# Patient Record
Sex: FEMALE | Race: WHITE
[De-identification: names, ages, dates, MRNs, and addresses within clinical notes are randomized per-mention and may not be internally consistent; named-entity substitution may affect disease eponyms.]

---

## 2017-04-17 NOTE — ED
General Adult HPI





- General


Chief complaint: Shortness of Breath


Stated complaint: Cough/SOB


Time Seen by Provider: 04/17/17 13:42


Source: patient


Mode of arrival: ambulatory


Limitations: no limitations





- History of Present Illness


Initial comments: 





51-year-old female with history of asthma presenting for shortness of breath 

and cough.  Patient states she's had symptoms for the past week.  She states she

's having worsening productive cough.  She has tried a home nebulizer treatment 

once without much improvement.  She denies any recent antibiotic or steroid 

therapy.  She denies any chest pain associated.  She denies any fevers or 

chills.





- Related Data


 Home Medications











 Medication  Instructions  Recorded  Confirmed


 


ARIPiprazole [Abilify] 30 mg PO HS 11/06/16 04/17/17


 


DULoxetine HCL [Cymbalta] 20 mg PO HS 11/06/16 04/17/17


 


Dextroamphetamine/Amphetamine 30 mg PO DAILY 11/06/16 04/17/17





[Adderall]   


 


clonazePAM [KlonoPIN] 1 mg PO HS 11/06/16 04/17/17


 


lamoTRIgine [LaMICtal] 25 mg PO HS 11/06/16 04/17/17








 Previous Rx's











 Medication  Instructions  Recorded


 


Albuterol Inhaler [Ventolin Hfa 2 puff INHALATION Q6HR PRN #1 04/17/17





Inhaler] inhaler 


 


Azithromycin [Zithromax] 250 mg PO DAILY #6 tab 04/17/17


 


predniSONE 40 mg PO DAILY #10 tab 04/17/17











 Allergies











Allergy/AdvReac Type Severity Reaction Status Date / Time


 


No Known Allergies Allergy   Verified 04/17/17 14:12














Review of Systems


ROS Statement: 


Those systems with pertinent positive or pertinent negative responses have been 

documented in the HPI.





ROS Other: All systems not noted in ROS Statement are negative.





Past Medical History


Past Medical History: Asthma


History of Any Multi-Drug Resistant Organisms: None Reported


Past Surgical History: No Surgical Hx Reported


Past Psychological History: Bipolar


Smoking Status: Current every day smoker


Past Alcohol Use History: None Reported


Past Drug Use History: None Reported





General Exam





- General Exam Comments


Initial Comments: 





General:  Awake and Alert. No acute distress. Does not appear acutely ill.  

Obese.


Eyes: JOSE DE JESUS, EOM intact. No nystagmus. No scleral icterus.  


HENT: Atraumatic, normocephalic. Mucous membranes moist. Trachea midline.


Neck: The neck is supple, there is no tenderness or JVD.  


Cardiovascular:  Regular rate and rhythm. No murmur, rub, or gallop is 

appreciated. Distal pulses intact.


Respiratory:  Lungs are clear to auscultation bilaterally.  Diffuse wheezes. No 

rales, rhonchi. No respiratory distress.


Gastrointestinal:  Soft, Nontender. No rebound or guarding. Non-distended. No 

masses or organomegaly noted. No CVA tenderness.


Musculoskeletal:  No tenderness. Normal ROM. No gross deformity. No strength 

deficits. 


Neurological: A&Ox3. CN II-XII grossly intact, There are no obvious motor or 

sensory deficits. Coordination appears grossly intact. Speech is normal.


Skin: Skin is warm and dry and no rashes or lesions are noted. 


Psychiatric: Cooperative, appropriate mood & affect, normal judgment.  


Limitations: no limitations





Course


 Vital Signs











  04/17/17 04/17/17 04/17/17





  13:26 13:41 14:04


 


Temperature 97.6 F  


 


Pulse Rate 90  91


 


Respiratory 18 20 





Rate   


 


Blood Pressure 120/76  


 


O2 Sat by Pulse 96  





Oximetry   














  04/17/17 04/17/17 04/17/17





  14:09 14:22 15:07


 


Temperature   98.2 F


 


Pulse Rate 97 80 79


 


Respiratory  18 18





Rate   


 


Blood Pressure   142/69


 


O2 Sat by Pulse  98 98





Oximetry   














Medical Decision Making





- Medical Decision Making





51-year-old female history of tobacco abuse and COPD presenting for cough and 

shortness of breath.  Patient without any significant respiratory distress 

initial exam.  She does have diffuse wheezes.  She is given a breathing 

treatment as well as initial dose of steroids.  Chest x-ray is performed 

without acute process.  Given her productive cough and risk with smoking and 

COPD, plan to cover azithromycin as well as steroid therapy.  Patient was 

written for inhaler.  She states she has an old nebulizer that does not always 

work.  Discussed close follow-up with PCP and to discuss getting a new one.  On 

reevaluation patient's wheezing appears improved.  She states she is feeling 

much better.  Discussed concerning signs symptoms for immediate return to ED.  

Patient is otherwise stable for discharge home.  Patient is agreeable with plan 

and discharge home.





- Radiology Data


Radiology results: report reviewed, image reviewed





Disposition


Clinical Impression: 


 COPD exacerbation, Cough, Tobacco abuse





Disposition: HOME SELF-CARE


Condition: Stable


Instructions:  COPD (Chronic Obstructive Pulmonary Disease) (ED), Acute Cough (

ED)


Prescriptions: 


Albuterol Inhaler [Ventolin Hfa Inhaler] 2 puff INHALATION Q6HR PRN #1 inhaler


 PRN Reason: Shortness Of Breath


Azithromycin [Zithromax] 250 mg PO DAILY #6 tab


predniSONE 40 mg PO DAILY #10 tab


Referrals: 


ERIK Tineo III, MD [Primary Care Provider] - 1-2 days


Time of Disposition: 14:51

## 2017-04-17 NOTE — XR
EXAMINATION TYPE: XR chest 2V

 

DATE OF EXAM: 4/17/2017 2:16 PM

 

COMPARISON: 11/6/2016

 

INDICATION: Cough congestion history of asthma

 

TECHNIQUE: Single frontal view of the chest is obtained.

 

FINDINGS:  

The heart size is normal.  

The pulmonary vasculature is normal.  

The lungs are clear.  

 

 

IMPRESSION:  

1. No acute pulmonary process.

## 2018-02-26 NOTE — ED
General Adult HPI





- General


Chief complaint: Recheck/Abnormal Lab/Rx


Stated complaint: tired/cannot urinate


Time Seen by Provider: 02/26/18 15:50


Source: patient, RN notes reviewed


Mode of arrival: ambulatory


Limitations: no limitations





- History of Present Illness


Initial comments: 





This a 52-year-old female presents emergency Department with complaints of 

fatigue, decrease appetite.  She states that over the last 2 days she just hasn'

t felt well.  States she does not want to eat or drink.  She denies any chest 

pain, shortness breath, headache or dizziness.  She states that she is very 

thirsty at this time but doesn't have the energy to eat or drink.  She denies 

any known fever denies dysuria or hematuria denies any constipation or diarrhea.





- Related Data


 Home Medications











 Medication  Instructions  Recorded  Confirmed


 


DULoxetine HCL [Cymbalta] 20 mg PO DAILY 11/06/16 02/26/18


 


Albuterol Inhaler [Ventolin Hfa 2 puff INHALATION RT-Q6H PRN 02/26/18 02/26/18





Inhaler]   








 Previous Rx's











 Medication  Instructions  Recorded


 


Ciprofloxacin HCl [Cipro] 500 mg PO Q12HR #10 tablet 02/26/18











 Allergies











Allergy/AdvReac Type Severity Reaction Status Date / Time


 


No Known Allergies Allergy   Verified 02/26/18 16:52














Review of Systems


ROS Statement: 


Those systems with pertinent positive or pertinent negative responses have been 

documented in the HPI.





ROS Other: All systems not noted in ROS Statement are negative.





Past Medical History


Past Medical History: Asthma


History of Any Multi-Drug Resistant Organisms: None Reported


Past Surgical History: No Surgical Hx Reported


Past Psychological History: Bipolar


Smoking Status: Current every day smoker


Past Alcohol Use History: None Reported


Past Drug Use History: None Reported





General Exam


Limitations: no limitations


General appearance: alert, in no apparent distress


Head exam: Present: atraumatic, normocephalic, normal inspection


Eye exam: Present: normal appearance, PERRL, EOMI.  Absent: scleral icterus, 

conjunctival injection, periorbital swelling


ENT exam: Present: normal exam, normal oropharynx, mucous membranes moist, TM's 

normal bilaterally, normal external ear exam


Neck exam: Present: normal inspection, full ROM.  Absent: tenderness, 

meningismus, lymphadenopathy


Respiratory exam: Present: normal lung sounds bilaterally.  Absent: respiratory 

distress, wheezes, rales, rhonchi, stridor


Cardiovascular Exam: Present: regular rate, normal rhythm, normal heart sounds.

  Absent: systolic murmur, diastolic murmur, rubs, gallop, clicks


GI/Abdominal exam: Present: soft, normal bowel sounds.  Absent: distended, 

tenderness, guarding, rebound, rigid


Back exam: Absent: CVA tenderness (R), CVA tenderness (L)


Skin exam: Present: warm, dry, intact, normal color.  Absent: rash





Course


 Vital Signs











  02/26/18





  15:32


 


Temperature 97.5 F L


 


Pulse Rate 66


 


Respiratory 17





Rate 


 


Blood Pressure 129/81


 


O2 Sat by Pulse 93 L





Oximetry 














EKG Findings





- EKG Comments:


EKG Findings:: EKG performed at 16:16 normal sinus rhythm with a rate of 66.   

pr 168 QRS 82 QT//434





Medical Decision Making





- Medical Decision Making





52-year-old female presented emergency department for weakness, not feeling 

well.  Patient does have urinary tract infection.  Patient's labwork otherwise 

unremarkable.  Patient was well-hydrated emergency department.  She'll be 

discharged on antibiotics.





- Lab Data


Result diagrams: 


 02/26/18 16:03





 02/26/18 16:03


 Lab Results











  02/26/18 02/26/18 02/26/18 Range/Units





  16:03 16:03 16:03 


 


WBC   7.0   (3.8-10.6)  k/uL


 


RBC   5.22   (3.80-5.40)  m/uL


 


Hgb   15.0   (11.4-16.0)  gm/dL


 


Hct   47.1 H   (34.0-46.0)  %


 


MCV   90.2   (80.0-100.0)  fL


 


MCH   28.6   (25.0-35.0)  pg


 


MCHC   31.8   (31.0-37.0)  g/dL


 


RDW   14.5   (11.5-15.5)  %


 


Plt Count   356   (150-450)  k/uL


 


Neutrophils %   69   %


 


Lymphocytes %   21   %


 


Monocytes %   7   %


 


Eosinophils %   1   %


 


Basophils %   1   %


 


Neutrophils #   4.9   (1.3-7.7)  k/uL


 


Lymphocytes #   1.5   (1.0-4.8)  k/uL


 


Monocytes #   0.5   (0-1.0)  k/uL


 


Eosinophils #   0.1   (0-0.7)  k/uL


 


Basophils #   0.1   (0-0.2)  k/uL


 


Sodium    142  (137-145)  mmol/L


 


Potassium    4.3  (3.5-5.1)  mmol/L


 


Chloride    107  ()  mmol/L


 


Carbon Dioxide    23  (22-30)  mmol/L


 


Anion Gap    12  mmol/L


 


BUN    11  (7-17)  mg/dL


 


Creatinine    0.70  (0.52-1.04)  mg/dL


 


Est GFR (MDRD) Af Amer    >60  (>60 ml/min/1.73 sqM)  


 


Est GFR (MDRD) Non-Af    >60  (>60 ml/min/1.73 sqM)  


 


Glucose    89  (74-99)  mg/dL


 


Calcium    9.6  (8.4-10.2)  mg/dL


 


Magnesium    1.9  (1.6-2.3)  mg/dL


 


Total Bilirubin    0.5  (0.2-1.3)  mg/dL


 


AST    17  (14-36)  U/L


 


ALT    27  (9-52)  U/L


 


Alkaline Phosphatase    96  ()  U/L


 


Total Creatine Kinase  57    ()  U/L


 


CK-MB (CK-2)  0.2    (0.0-2.4)  ng/mL


 


CK-MB (CK-2) Rel Index  0.4    


 


Troponin I  <0.012    (0.000-0.034)  ng/mL


 


Total Protein    7.0  (6.3-8.2)  g/dL


 


Albumin    4.1  (3.5-5.0)  g/dL


 


TSH    1.010  (0.465-4.680)  mIU/L


 


Urine Color     


 


Urine Appearance     (Clear)  


 


Urine pH     (5.0-8.0)  


 


Ur Specific Gravity     (1.001-1.035)  


 


Urine Protein     (Negative)  


 


Urine Glucose (UA)     (Negative)  


 


Urine Blood     (Negative)  


 


Urine Nitrite     (Negative)  


 


Urine Bilirubin     (Negative)  


 


Urine Urobilinogen     (<2.0)  mg/dL


 


Ur Leukocyte Esterase     (Negative)  


 


Urine RBC     (0-5)  /hpf


 


Urine WBC     (0-5)  /hpf


 


Ur Squamous Epith Cells     (0-4)  /hpf


 


Urine Mucus     (None)  /hpf














  02/26/18 Range/Units





  16:43 


 


WBC   (3.8-10.6)  k/uL


 


RBC   (3.80-5.40)  m/uL


 


Hgb   (11.4-16.0)  gm/dL


 


Hct   (34.0-46.0)  %


 


MCV   (80.0-100.0)  fL


 


MCH   (25.0-35.0)  pg


 


MCHC   (31.0-37.0)  g/dL


 


RDW   (11.5-15.5)  %


 


Plt Count   (150-450)  k/uL


 


Neutrophils %   %


 


Lymphocytes %   %


 


Monocytes %   %


 


Eosinophils %   %


 


Basophils %   %


 


Neutrophils #   (1.3-7.7)  k/uL


 


Lymphocytes #   (1.0-4.8)  k/uL


 


Monocytes #   (0-1.0)  k/uL


 


Eosinophils #   (0-0.7)  k/uL


 


Basophils #   (0-0.2)  k/uL


 


Sodium   (137-145)  mmol/L


 


Potassium   (3.5-5.1)  mmol/L


 


Chloride   ()  mmol/L


 


Carbon Dioxide   (22-30)  mmol/L


 


Anion Gap   mmol/L


 


BUN   (7-17)  mg/dL


 


Creatinine   (0.52-1.04)  mg/dL


 


Est GFR (MDRD) Af Amer   (>60 ml/min/1.73 sqM)  


 


Est GFR (MDRD) Non-Af   (>60 ml/min/1.73 sqM)  


 


Glucose   (74-99)  mg/dL


 


Calcium   (8.4-10.2)  mg/dL


 


Magnesium   (1.6-2.3)  mg/dL


 


Total Bilirubin   (0.2-1.3)  mg/dL


 


AST   (14-36)  U/L


 


ALT   (9-52)  U/L


 


Alkaline Phosphatase   ()  U/L


 


Total Creatine Kinase   ()  U/L


 


CK-MB (CK-2)   (0.0-2.4)  ng/mL


 


CK-MB (CK-2) Rel Index   


 


Troponin I   (0.000-0.034)  ng/mL


 


Total Protein   (6.3-8.2)  g/dL


 


Albumin   (3.5-5.0)  g/dL


 


TSH   (0.465-4.680)  mIU/L


 


Urine Color  Yellow  


 


Urine Appearance  Cloudy H  (Clear)  


 


Urine pH  5.5  (5.0-8.0)  


 


Ur Specific Gravity  1.023  (1.001-1.035)  


 


Urine Protein  1+ H  (Negative)  


 


Urine Glucose (UA)  Negative  (Negative)  


 


Urine Blood  Negative  (Negative)  


 


Urine Nitrite  Positive H  (Negative)  


 


Urine Bilirubin  Negative  (Negative)  


 


Urine Urobilinogen  4.0  (<2.0)  mg/dL


 


Ur Leukocyte Esterase  Small H  (Negative)  


 


Urine RBC  1  (0-5)  /hpf


 


Urine WBC  16 H  (0-5)  /hpf


 


Ur Squamous Epith Cells  3  (0-4)  /hpf


 


Urine Mucus  Many H  (None)  /hpf














Disposition


Clinical Impression: 


 UTI (urinary tract infection), Fatigue





Disposition: HOME SELF-CARE


Condition: Stable


Instructions:  Urinary Tract Infection in Women (ED)


Additional Instructions: 


Please return to the Emergency Department if symptoms worsen or any other 

concerns.


Prescriptions: 


Ciprofloxacin HCl [Cipro] 500 mg PO Q12HR #10 tablet


Referrals: 


Cristóbal Mancini DO [Primary Care Provider] - 1-2 days


Time of Disposition: 17:17

## 2018-04-29 ENCOUNTER — HOSPITAL ENCOUNTER (EMERGENCY)
Dept: HOSPITAL 47 - EC | Age: 53
Discharge: HOME | End: 2018-04-29
Payer: COMMERCIAL

## 2018-04-29 VITALS
RESPIRATION RATE: 18 BRPM | HEART RATE: 79 BPM | TEMPERATURE: 98.3 F | SYSTOLIC BLOOD PRESSURE: 105 MMHG | DIASTOLIC BLOOD PRESSURE: 70 MMHG

## 2018-04-29 DIAGNOSIS — Z79.899: ICD-10-CM

## 2018-04-29 DIAGNOSIS — Z23: ICD-10-CM

## 2018-04-29 DIAGNOSIS — F17.200: ICD-10-CM
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
## 2018-04-29 DIAGNOSIS — S61.411A: Primary | ICD-10-CM
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
## 2018-04-29 DIAGNOSIS — F31.9: ICD-10-CM

## 2018-04-29 PROCEDURE — 90471 IMMUNIZATION ADMIN: CPT

## 2018-04-29 PROCEDURE — 99283 EMERGENCY DEPT VISIT LOW MDM: CPT

## 2018-04-29 PROCEDURE — 90715 TDAP VACCINE 7 YRS/> IM: CPT

## 2018-04-29 NOTE — ED
General Adult HPI





- General


Chief complaint: Animal Bite


Stated complaint: Cat Bite


Time Seen by Provider: 04/29/18 16:30


Source: patient, RN notes reviewed


Mode of arrival: ambulatory


Limitations: no limitations





- History of Present Illness


Initial comments: 





52-year-old female presents to the emergency department for chief complaint of 

cat bite x 1 hour.  Patient states that an hour ago her cat was about 5 or 

other cat when she intervened and was bitten and scratched on the right hand.  

Patient states he has cats are up-to-date with the rabies vaccinations and all 

other vaccinations.  Patient states these cats are her own cats.  Patient 

denies any other complaints including shortness of breath, chest pain, 

abdominal pain, nausea or vomiting.





- Related Data


 Home Medications











 Medication  Instructions  Recorded  Confirmed


 


DULoxetine HCL [Cymbalta] 20 mg PO DAILY 11/06/16 02/26/18


 


Albuterol Inhaler [Ventolin Hfa 2 puff INHALATION RT-Q6H PRN 02/26/18 02/26/18





Inhaler]   








 Previous Rx's











 Medication  Instructions  Recorded


 


Ciprofloxacin HCl [Cipro] 500 mg PO Q12HR #10 tablet 02/26/18


 


Amoxicillin/Potassium Clav 1 tab PO Q12HR #20 tab 04/29/18





[Augmentin 875-125 Tablet]  











 Allergies











Allergy/AdvReac Type Severity Reaction Status Date / Time


 


No Known Allergies Allergy   Verified 04/29/18 16:07














Review of Systems


ROS Statement: 


Those systems with pertinent positive or pertinent negative responses have been 

documented in the HPI.





ROS Other: All systems not noted in ROS Statement are negative.





Past Medical History


Past Medical History: Asthma


History of Any Multi-Drug Resistant Organisms: None Reported


Past Surgical History: No Surgical Hx Reported


Past Psychological History: Bipolar


Smoking Status: Current every day smoker


Past Alcohol Use History: None Reported


Past Drug Use History: None Reported





General Exam


Limitations: no limitations


General appearance: alert, in no apparent distress


Head exam: Present: atraumatic


Respiratory exam: Present: normal lung sounds bilaterally.  Absent: respiratory 

distress, wheezes, rales, rhonchi, stridor


Cardiovascular Exam: Present: regular rate, normal rhythm, normal heart sounds.

  Absent: systolic murmur, diastolic murmur, rubs, gallop, clicks


Extremities exam: Present: normal inspection, full ROM (in RUE.  All fingers 

have full flexion and extension with resistance.  No tendon injury.  No deeper 

tissue injury.  ), normal capillary refill (< 2 seconds and radial pulse 2+ in 

RUE.), other (there is a 0.5 cm laceration on the proximal 2nd metatarsal and 2 

cm laceration on the proximal 4th metatarsal of the L hand.  There is some 

ecchymosis around the lacerations.  No redness or drainage from the wounds.  No 

signs of infection.  No streaking redness.  ).  Absent: tenderness (in RUE), 

pedal edema, joint swelling, calf tenderness





Course


 Vital Signs











  04/29/18





  16:04


 


Temperature 98.3 F


 


Pulse Rate 79


 


Respiratory 18





Rate 


 


Blood Pressure 105/70


 


O2 Sat by Pulse 94 L





Oximetry 














Medical Decision Making





- Medical Decision Making





52-year-old female presents to the emergency department for a chief complaint 

of Bites to the right hand.  There are 2 small lacerations on the dorsal right 

hand as well as scratches.  No signs of infection noted.  Neurovascular intact 

in full range of motion of the right hand.  Wound was soaked in sterile water, 

iodine, and soap for 20 minutes.  Wound was cleaned out with saline jet lavage.

  It was cleaned out with iodine.  Wounds were covered with bacitracin and Band-

Aids.  Wounds were left open to allow for drainage.  Patient is to follow-up 

with orthopedics in one to 2 days.  She can follow-up with her primary care 

provider if she cannot get into see orthopedics.  She is to return to the 

emergency Department if she notices any signs of infection, fever, or worsening 

symptoms.  Signs of infection were discussed.  Patient agrees with this route 

of action.





Disposition


Clinical Impression: 


 Cat bite





Disposition: HOME SELF-CARE


Condition: Good


Instructions:  Animal Bite (ED)


Additional Instructions: 


Please keep wounds clean.  You may cover wounds with a loose Band-Aid.  Finish 

antibiotic as directed.  You may ice and elevate the hand as discussed.  Follow 

up with orthopedics tomorrow.  If any signs of infection occur such as 

spreading redness, streaking redness, drainage, or fevers please return to the 

emergency department.


Prescriptions: 


Amoxicillin/Potassium Clav [Augmentin 875-125 Tablet] 1 tab PO Q12HR #20 tab


Is patient prescribed a controlled substance at d/c from ED?: No


Referrals: 


None,Stated [Primary Care Provider] - 1-2 days


RUTHANN Shah DO [Doctor of Osteopathic Medicine] - 1-2 days


Time of Disposition: 17:11

## 2018-10-06 ENCOUNTER — HOSPITAL ENCOUNTER (EMERGENCY)
Dept: HOSPITAL 47 - EC | Age: 53
Discharge: LEFT BEFORE DISCHARGE | End: 2018-10-06
Payer: COMMERCIAL

## 2018-10-06 VITALS — SYSTOLIC BLOOD PRESSURE: 115 MMHG | HEART RATE: 94 BPM | DIASTOLIC BLOOD PRESSURE: 59 MMHG

## 2018-10-06 VITALS — TEMPERATURE: 99.2 F | RESPIRATION RATE: 18 BRPM

## 2018-10-06 DIAGNOSIS — J44.1: Primary | ICD-10-CM

## 2018-10-06 DIAGNOSIS — Z79.899: ICD-10-CM

## 2018-10-06 DIAGNOSIS — F32.9: ICD-10-CM

## 2018-10-06 DIAGNOSIS — F17.210: ICD-10-CM

## 2018-10-06 LAB
ALBUMIN SERPL-MCNC: 3.9 G/DL (ref 3.5–5)
ALP SERPL-CCNC: 76 U/L (ref 38–126)
ALT SERPL-CCNC: 12 U/L (ref 9–52)
ANION GAP SERPL CALC-SCNC: 8 MMOL/L
AST SERPL-CCNC: 17 U/L (ref 14–36)
BASOPHILS # BLD AUTO: 0.1 K/UL (ref 0–0.2)
BASOPHILS NFR BLD AUTO: 1 %
BUN SERPL-SCNC: 11 MG/DL (ref 7–17)
CALCIUM SPEC-MCNC: 9.4 MG/DL (ref 8.4–10.2)
CHLORIDE SERPL-SCNC: 111 MMOL/L (ref 98–107)
CO2 SERPL-SCNC: 23 MMOL/L (ref 22–30)
EOSINOPHIL # BLD AUTO: 0.2 K/UL (ref 0–0.7)
EOSINOPHIL NFR BLD AUTO: 2 %
ERYTHROCYTE [DISTWIDTH] IN BLOOD BY AUTOMATED COUNT: 5.27 M/UL (ref 3.8–5.4)
ERYTHROCYTE [DISTWIDTH] IN BLOOD: 13.9 % (ref 11.5–15.5)
GLUCOSE SERPL-MCNC: 94 MG/DL (ref 74–99)
HCT VFR BLD AUTO: 46.7 % (ref 34–46)
HGB BLD-MCNC: 16.2 GM/DL (ref 11.4–16)
LYMPHOCYTES # SPEC AUTO: 2 K/UL (ref 1–4.8)
LYMPHOCYTES NFR SPEC AUTO: 24 %
MAGNESIUM SPEC-SCNC: 1.9 MG/DL (ref 1.6–2.3)
MCH RBC QN AUTO: 30.7 PG (ref 25–35)
MCHC RBC AUTO-ENTMCNC: 34.6 G/DL (ref 31–37)
MCV RBC AUTO: 88.7 FL (ref 80–100)
MONOCYTES # BLD AUTO: 0.5 K/UL (ref 0–1)
MONOCYTES NFR BLD AUTO: 6 %
NEUTROPHILS # BLD AUTO: 5.5 K/UL (ref 1.3–7.7)
NEUTROPHILS NFR BLD AUTO: 66 %
PLATELET # BLD AUTO: 354 K/UL (ref 150–450)
POTASSIUM SERPL-SCNC: 4.6 MMOL/L (ref 3.5–5.1)
PROT SERPL-MCNC: 6.8 G/DL (ref 6.3–8.2)
SODIUM SERPL-SCNC: 142 MMOL/L (ref 137–145)
WBC # BLD AUTO: 8.3 K/UL (ref 3.8–10.6)

## 2018-10-06 PROCEDURE — 83735 ASSAY OF MAGNESIUM: CPT

## 2018-10-06 PROCEDURE — 84484 ASSAY OF TROPONIN QUANT: CPT

## 2018-10-06 PROCEDURE — 83880 ASSAY OF NATRIURETIC PEPTIDE: CPT

## 2018-10-06 PROCEDURE — 96374 THER/PROPH/DIAG INJ IV PUSH: CPT

## 2018-10-06 PROCEDURE — 85379 FIBRIN DEGRADATION QUANT: CPT

## 2018-10-06 PROCEDURE — 80053 COMPREHEN METABOLIC PANEL: CPT

## 2018-10-06 PROCEDURE — 85025 COMPLETE CBC W/AUTO DIFF WBC: CPT

## 2018-10-06 PROCEDURE — 93005 ELECTROCARDIOGRAM TRACING: CPT

## 2018-10-06 PROCEDURE — 36415 COLL VENOUS BLD VENIPUNCTURE: CPT

## 2018-10-06 PROCEDURE — 94640 AIRWAY INHALATION TREATMENT: CPT

## 2018-10-06 PROCEDURE — 71046 X-RAY EXAM CHEST 2 VIEWS: CPT

## 2018-10-06 PROCEDURE — 99285 EMERGENCY DEPT VISIT HI MDM: CPT

## 2018-10-06 NOTE — XR
EXAMINATION TYPE: XR chest 2V

 

DATE OF EXAM: 10/6/2018

 

COMPARISON: 2/26/2018

 

HISTORY: Chest pain and difficulty breathing

 

TECHNIQUE:  Frontal and lateral views of the chest are obtained.

 

FINDINGS:  There is no focal air space opacity, pleural effusion, or pneumothorax seen.  The cardiac 
silhouette size is within normal limits.   The osseous structures are intact.

 

IMPRESSION:  No acute cardiopulmonary process.

## 2018-10-06 NOTE — ED
General Adult HPI





- General


Chief complaint: Shortness of Breath


Stated complaint: Sob


Time Seen by Provider: 10/06/18 15:01


Source: patient


Mode of arrival: EMS


Limitations: no limitations





- History of Present Illness


Initial comments: 


Patient is a 53-year-old female with a history of asthma, and emphysema who 

presents with a chief complaint of shortness of breath.  This been going on for 

about a week and half.  Patient states this is gradually been getting worse.  

She cannot identify an inciting incident.  She is a current pack per day smoker 

is trying to quit.  The patient states that today she became more short of 

breath and experienced chest pain, diaphoresis, had one episode of emesis, and 

felt lightheaded.  She states that the chest pain was sharp in nature and 

squeezing.  She states that she is currently pain-free.  She has not had a 

recent stress test.








- Related Data


 Home Medications











 Medication  Instructions  Recorded  Confirmed


 


DULoxetine HCL [Cymbalta] 20 mg PO DAILY 11/06/16 04/29/18


 


Albuterol Inhaler [Ventolin Hfa 2 puff INHALATION RT-Q6H PRN 02/26/18 04/29/18





Inhaler]   








 Previous Rx's











 Medication  Instructions  Recorded


 


Amoxicillin/Potassium Clav 1 tab PO Q12HR #20 tab 04/29/18





[Augmentin 875-125 Tablet]  











 Allergies











Allergy/AdvReac Type Severity Reaction Status Date / Time


 


No Known Allergies Allergy   Verified 04/29/18 16:07














Review of Systems


ROS Statement: 


Those systems with pertinent positive or pertinent negative responses have been 

documented in the HPI.





ROS Other: All systems not noted in ROS Statement are negative.


Respiratory: Reports: dyspnea, wheezes


Cardiovascular: Reports: chest pain





Past Medical History


Past Medical History: Asthma


History of Any Multi-Drug Resistant Organisms: None Reported


Past Surgical History: Tubal Ligation


Past Psychological History: Bipolar, Depression


Smoking Status: Current every day smoker


Past Alcohol Use History: None Reported


Past Drug Use History: None Reported





General Exam


Limitations: no limitations


General appearance: alert, in no apparent distress


Head exam: Present: atraumatic, normocephalic


Eye exam: Present: normal appearance, PERRL


ENT exam: Present: normal exam, mucous membranes moist


Neck exam: Present: normal inspection


Respiratory exam: Present: normal lung sounds bilaterally.  Absent: respiratory 

distress, wheezes


Cardiovascular Exam: Present: regular rate, normal rhythm


GI/Abdominal exam: Present: soft.  Absent: distended, tenderness


Rectal exam: Present: deferred


Extremities exam: Present: normal inspection


Back exam: Present: normal inspection


Neurological exam: Present: alert, oriented X3


Psychiatric exam: Present: normal affect, normal mood


Skin exam: Present: warm, dry, intact





Course


 Vital Signs











  10/06/18 10/06/18 10/06/18





  14:53 15:53 16:03


 


Temperature 99.2 F  


 


Pulse Rate 90 80 84


 


Respiratory 18  





Rate   


 


Blood Pressure 108/68  


 


O2 Sat by Pulse 94 L  





Oximetry   














  10/06/18 10/06/18





  16:18 17:31


 


Temperature  99.2 F


 


Pulse Rate 82 89


 


Respiratory  18





Rate  


 


Blood Pressure  110/59


 


O2 Sat by Pulse  98





Oximetry  














Medical Decision Making





- Medical Decision Making


Review presents with chief complaint of shortness of breath and chest pain.  On 

initial evaluation, vitals are stable, patient is in no acute distress.  She 

received 1 DuoNeb and round and states that her breathing is much improved.  

Patient will be evaluated with basic labs including troponin, and chest x-ray.  

Patient given 3 breathing treatments, Solu-Medrol, and aspirin on arrival.  

Pending EKG.





3:37 PM


EKG performed at 3:10 PM shows normal sinus rhythm with a rate of 84 bpm.  EKG 

is otherwise unremarkable.





5:59 PM


Lab evaluation this patient is unremarkable.  Initial troponin is negative.  On 

reevaluation, patient is improved after breathing treatments and steroids.  

Case discussed with Dr. Stromberg who agrees with admission for stress test.  

Results and I discussed with the patient, she is agreeable.








- Lab Data


Result diagrams: 


 10/06/18 15:00





 10/06/18 15:00


 Lab Results











  10/06/18 10/06/18 10/06/18 Range/Units





  15:00 15:00 15:00 


 


WBC  8.3    (3.8-10.6)  k/uL


 


RBC  5.27    (3.80-5.40)  m/uL


 


Hgb  16.2 H    (11.4-16.0)  gm/dL


 


Hct  46.7 H    (34.0-46.0)  %


 


MCV  88.7    (80.0-100.0)  fL


 


MCH  30.7    (25.0-35.0)  pg


 


MCHC  34.6    (31.0-37.0)  g/dL


 


RDW  13.9    (11.5-15.5)  %


 


Plt Count  354    (150-450)  k/uL


 


Neutrophils %  66    %


 


Lymphocytes %  24    %


 


Monocytes %  6    %


 


Eosinophils %  2    %


 


Basophils %  1    %


 


Neutrophils #  5.5    (1.3-7.7)  k/uL


 


Lymphocytes #  2.0    (1.0-4.8)  k/uL


 


Monocytes #  0.5    (0-1.0)  k/uL


 


Eosinophils #  0.2    (0-0.7)  k/uL


 


Basophils #  0.1    (0-0.2)  k/uL


 


D-Dimer     (<0.60)  mg/L FEU


 


Sodium   142   (137-145)  mmol/L


 


Potassium   4.6   (3.5-5.1)  mmol/L


 


Chloride   111 H   ()  mmol/L


 


Carbon Dioxide   23   (22-30)  mmol/L


 


Anion Gap   8   mmol/L


 


BUN   11   (7-17)  mg/dL


 


Creatinine   0.70   (0.52-1.04)  mg/dL


 


Est GFR (CKD-EPI)AfAm   >90   (>60 ml/min/1.73 sqM)  


 


Est GFR (CKD-EPI)NonAf   >90   (>60 ml/min/1.73 sqM)  


 


Glucose   94   (74-99)  mg/dL


 


Calcium   9.4   (8.4-10.2)  mg/dL


 


Magnesium   1.9   (1.6-2.3)  mg/dL


 


Total Bilirubin   0.5   (0.2-1.3)  mg/dL


 


AST   17   (14-36)  U/L


 


ALT   12   (9-52)  U/L


 


Alkaline Phosphatase   76   ()  U/L


 


Troponin I     (0.000-0.034)  ng/mL


 


NT-Pro-B Natriuret Pep    42  pg/mL


 


Total Protein   6.8   (6.3-8.2)  g/dL


 


Albumin   3.9   (3.5-5.0)  g/dL














  10/06/18 10/06/18 Range/Units





  15:00 15:00 


 


WBC    (3.8-10.6)  k/uL


 


RBC    (3.80-5.40)  m/uL


 


Hgb    (11.4-16.0)  gm/dL


 


Hct    (34.0-46.0)  %


 


MCV    (80.0-100.0)  fL


 


MCH    (25.0-35.0)  pg


 


MCHC    (31.0-37.0)  g/dL


 


RDW    (11.5-15.5)  %


 


Plt Count    (150-450)  k/uL


 


Neutrophils %    %


 


Lymphocytes %    %


 


Monocytes %    %


 


Eosinophils %    %


 


Basophils %    %


 


Neutrophils #    (1.3-7.7)  k/uL


 


Lymphocytes #    (1.0-4.8)  k/uL


 


Monocytes #    (0-1.0)  k/uL


 


Eosinophils #    (0-0.7)  k/uL


 


Basophils #    (0-0.2)  k/uL


 


D-Dimer  0.30   (<0.60)  mg/L FEU


 


Sodium    (137-145)  mmol/L


 


Potassium    (3.5-5.1)  mmol/L


 


Chloride    ()  mmol/L


 


Carbon Dioxide    (22-30)  mmol/L


 


Anion Gap    mmol/L


 


BUN    (7-17)  mg/dL


 


Creatinine    (0.52-1.04)  mg/dL


 


Est GFR (CKD-EPI)AfAm    (>60 ml/min/1.73 sqM)  


 


Est GFR (CKD-EPI)NonAf    (>60 ml/min/1.73 sqM)  


 


Glucose    (74-99)  mg/dL


 


Calcium    (8.4-10.2)  mg/dL


 


Magnesium    (1.6-2.3)  mg/dL


 


Total Bilirubin    (0.2-1.3)  mg/dL


 


AST    (14-36)  U/L


 


ALT    (9-52)  U/L


 


Alkaline Phosphatase    ()  U/L


 


Troponin I   <0.012  (0.000-0.034)  ng/mL


 


NT-Pro-B Natriuret Pep    pg/mL


 


Total Protein    (6.3-8.2)  g/dL


 


Albumin    (3.5-5.0)  g/dL














Disposition


Clinical Impression: 


 COPD exacerbation, Moderate risk chest pain





Disposition: ADMITTED AS IP TO THIS HOSP


Condition: Good


Is patient prescribed a controlled substance at d/c from ED?: No


Referrals: 


Brendan Nassar Jr,  [Primary Care Provider] - 1-2 days





- Out of Hospital Transfer - Req. Specs


Out of Hospital Transfer - Requested Specifics: Telemetry Unit

## 2018-10-19 ENCOUNTER — HOSPITAL ENCOUNTER (OUTPATIENT)
Dept: HOSPITAL 47 - EC | Age: 53
Setting detail: OBSERVATION
LOS: 2 days | Discharge: HOME | End: 2018-10-21
Payer: COMMERCIAL

## 2018-10-19 VITALS — BODY MASS INDEX: 33 KG/M2

## 2018-10-19 DIAGNOSIS — M79.7: ICD-10-CM

## 2018-10-19 DIAGNOSIS — J45.901: ICD-10-CM

## 2018-10-19 DIAGNOSIS — E78.5: ICD-10-CM

## 2018-10-19 DIAGNOSIS — D72.829: ICD-10-CM

## 2018-10-19 DIAGNOSIS — I50.9: ICD-10-CM

## 2018-10-19 DIAGNOSIS — I11.0: ICD-10-CM

## 2018-10-19 DIAGNOSIS — R07.89: ICD-10-CM

## 2018-10-19 DIAGNOSIS — Z79.899: ICD-10-CM

## 2018-10-19 DIAGNOSIS — Z98.51: ICD-10-CM

## 2018-10-19 DIAGNOSIS — J44.1: Primary | ICD-10-CM

## 2018-10-19 DIAGNOSIS — K21.9: ICD-10-CM

## 2018-10-19 DIAGNOSIS — F31.9: ICD-10-CM

## 2018-10-19 DIAGNOSIS — F17.210: ICD-10-CM

## 2018-10-19 DIAGNOSIS — Z82.49: ICD-10-CM

## 2018-10-19 DIAGNOSIS — I25.10: ICD-10-CM

## 2018-10-19 DIAGNOSIS — R09.02: ICD-10-CM

## 2018-10-19 LAB
ALBUMIN SERPL-MCNC: 4.2 G/DL (ref 3.5–5)
ALP SERPL-CCNC: 82 U/L (ref 38–126)
ALT SERPL-CCNC: 19 U/L (ref 9–52)
ANION GAP SERPL CALC-SCNC: 8 MMOL/L
APTT BLD: 24.6 SEC (ref 22–30)
AST SERPL-CCNC: 15 U/L (ref 14–36)
BASOPHILS # BLD AUTO: 0.1 K/UL (ref 0–0.2)
BASOPHILS NFR BLD AUTO: 1 %
BUN SERPL-SCNC: 11 MG/DL (ref 7–17)
CALCIUM SPEC-MCNC: 9.7 MG/DL (ref 8.4–10.2)
CHLORIDE SERPL-SCNC: 110 MMOL/L (ref 98–107)
CK SERPL-CCNC: 30 U/L (ref 30–135)
CK SERPL-CCNC: 33 U/L (ref 30–135)
CO2 SERPL-SCNC: 23 MMOL/L (ref 22–30)
D DIMER PPP FEU-MCNC: 0.4 MG/L FEU (ref ?–0.6)
EOSINOPHIL # BLD AUTO: 0.2 K/UL (ref 0–0.7)
EOSINOPHIL NFR BLD AUTO: 2 %
ERYTHROCYTE [DISTWIDTH] IN BLOOD BY AUTOMATED COUNT: 5.18 M/UL (ref 3.8–5.4)
ERYTHROCYTE [DISTWIDTH] IN BLOOD: 13.8 % (ref 11.5–15.5)
GLUCOSE BLD-MCNC: 156 MG/DL (ref 75–99)
GLUCOSE SERPL-MCNC: 81 MG/DL (ref 74–99)
HCT VFR BLD AUTO: 47.6 % (ref 34–46)
HGB BLD-MCNC: 15.7 GM/DL (ref 11.4–16)
INR PPP: 0.9 (ref ?–1.2)
LIPASE SERPL-CCNC: 34 U/L (ref 23–300)
LYMPHOCYTES # SPEC AUTO: 2.3 K/UL (ref 1–4.8)
LYMPHOCYTES NFR SPEC AUTO: 23 %
MAGNESIUM SPEC-SCNC: 2.1 MG/DL (ref 1.6–2.3)
MCH RBC QN AUTO: 30.3 PG (ref 25–35)
MCHC RBC AUTO-ENTMCNC: 32.9 G/DL (ref 31–37)
MCV RBC AUTO: 92 FL (ref 80–100)
MONOCYTES # BLD AUTO: 0.5 K/UL (ref 0–1)
MONOCYTES NFR BLD AUTO: 5 %
NEUTROPHILS # BLD AUTO: 6.9 K/UL (ref 1.3–7.7)
NEUTROPHILS NFR BLD AUTO: 68 %
PLATELET # BLD AUTO: 362 K/UL (ref 150–450)
POTASSIUM SERPL-SCNC: 4.3 MMOL/L (ref 3.5–5.1)
PROT SERPL-MCNC: 7.5 G/DL (ref 6.3–8.2)
PT BLD: 9.4 SEC (ref 9–12)
SODIUM SERPL-SCNC: 141 MMOL/L (ref 137–145)
TROPONIN I SERPL-MCNC: <0.012 NG/ML (ref 0–0.03)
TROPONIN I SERPL-MCNC: <0.012 NG/ML (ref 0–0.03)
WBC # BLD AUTO: 10.1 K/UL (ref 3.8–10.6)

## 2018-10-19 PROCEDURE — 71046 X-RAY EXAM CHEST 2 VIEWS: CPT

## 2018-10-19 PROCEDURE — 83880 ASSAY OF NATRIURETIC PEPTIDE: CPT

## 2018-10-19 PROCEDURE — 99285 EMERGENCY DEPT VISIT HI MDM: CPT

## 2018-10-19 PROCEDURE — 80048 BASIC METABOLIC PNL TOTAL CA: CPT

## 2018-10-19 PROCEDURE — 83690 ASSAY OF LIPASE: CPT

## 2018-10-19 PROCEDURE — 85730 THROMBOPLASTIN TIME PARTIAL: CPT

## 2018-10-19 PROCEDURE — 94760 N-INVAS EAR/PLS OXIMETRY 1: CPT

## 2018-10-19 PROCEDURE — 82550 ASSAY OF CK (CPK): CPT

## 2018-10-19 PROCEDURE — 93005 ELECTROCARDIOGRAM TRACING: CPT

## 2018-10-19 PROCEDURE — 85379 FIBRIN DEGRADATION QUANT: CPT

## 2018-10-19 PROCEDURE — 83735 ASSAY OF MAGNESIUM: CPT

## 2018-10-19 PROCEDURE — 96376 TX/PRO/DX INJ SAME DRUG ADON: CPT

## 2018-10-19 PROCEDURE — 85025 COMPLETE CBC W/AUTO DIFF WBC: CPT

## 2018-10-19 PROCEDURE — 80061 LIPID PANEL: CPT

## 2018-10-19 PROCEDURE — 96374 THER/PROPH/DIAG INJ IV PUSH: CPT

## 2018-10-19 PROCEDURE — 94640 AIRWAY INHALATION TREATMENT: CPT

## 2018-10-19 PROCEDURE — 80053 COMPREHEN METABOLIC PANEL: CPT

## 2018-10-19 PROCEDURE — 96361 HYDRATE IV INFUSION ADD-ON: CPT

## 2018-10-19 PROCEDURE — 84484 ASSAY OF TROPONIN QUANT: CPT

## 2018-10-19 PROCEDURE — 96375 TX/PRO/DX INJ NEW DRUG ADDON: CPT

## 2018-10-19 PROCEDURE — 36415 COLL VENOUS BLD VENIPUNCTURE: CPT

## 2018-10-19 PROCEDURE — 82553 CREATINE MB FRACTION: CPT

## 2018-10-19 PROCEDURE — 85610 PROTHROMBIN TIME: CPT

## 2018-10-19 RX ADMIN — METHYLPREDNISOLONE SODIUM SUCCINATE SCH MG: 125 INJECTION, POWDER, FOR SOLUTION INTRAMUSCULAR; INTRAVENOUS at 23:22

## 2018-10-19 RX ADMIN — IPRATROPIUM BROMIDE AND ALBUTEROL SULFATE SCH: .5; 3 SOLUTION RESPIRATORY (INHALATION) at 18:43

## 2018-10-19 RX ADMIN — MORPHINE SULFATE PRN MG: 4 INJECTION, SOLUTION INTRAMUSCULAR; INTRAVENOUS at 21:14

## 2018-10-19 RX ADMIN — INSULIN ASPART SCH UNIT: 100 INJECTION, SOLUTION INTRAVENOUS; SUBCUTANEOUS at 21:16

## 2018-10-19 RX ADMIN — Medication SCH MG: at 23:21

## 2018-10-19 RX ADMIN — IPRATROPIUM BROMIDE AND ALBUTEROL SULFATE SCH ML: .5; 3 SOLUTION RESPIRATORY (INHALATION) at 23:37

## 2018-10-19 RX ADMIN — METHYLPREDNISOLONE SODIUM SUCCINATE SCH MG: 125 INJECTION, POWDER, FOR SOLUTION INTRAMUSCULAR; INTRAVENOUS at 21:14

## 2018-10-19 RX ADMIN — AMITRIPTYLINE HYDROCHLORIDE SCH MG: 25 TABLET, FILM COATED ORAL at 21:14

## 2018-10-19 RX ADMIN — FAMOTIDINE SCH MG: 20 TABLET, FILM COATED ORAL at 21:14

## 2018-10-19 RX ADMIN — CEFAZOLIN SCH MLS/HR: 330 INJECTION, POWDER, FOR SOLUTION INTRAMUSCULAR; INTRAVENOUS at 18:25

## 2018-10-19 RX ADMIN — Medication SCH: at 22:44

## 2018-10-19 RX ADMIN — IPRATROPIUM BROMIDE AND ALBUTEROL SULFATE SCH ML: .5; 3 SOLUTION RESPIRATORY (INHALATION) at 19:31

## 2018-10-19 NOTE — XR
EXAMINATION TYPE: XR chest 2V

 

DATE OF EXAM: 10/19/2018

 

COMPARISON: 10/6/2018

 

HISTORY: Left-sided chest pain and shortness of breath

 

TECHNIQUE:  Frontal and lateral views of the chest are obtained.

 

FINDINGS:  Copious soft tissues partially obscure the lower lungs on the frontal view however no foca
l airspace disease is seen on the lateral view. There is no pulmonary vascular congestion, pleural ef
fusion, or pneumothorax seen.  The cardiac silhouette size is upper limits of normal.   The osseous s
tructures are intact. Mild acromio clavicular arthropathy is seen.

 

IMPRESSION:  No acute cardiopulmonary process.

## 2018-10-19 NOTE — ED
Chest Pain HPI





- General


Chief Complaint: Chest Pain


Stated Complaint: chest pain


Time Seen by Provider: 10/19/18 14:27


Source: patient, RN notes reviewed, old records reviewed


Mode of arrival: ambulatory


Limitations: no limitations





- History of Present Illness


Initial Comments: 





This is a 33-year-old female the ER for evaluation chest pain.  Patient is 

underlying history of chest pain asthma COPD.  Patient states she's had chest 

pain on and off for about 2 weeks worsening today.  No fevers, she does have 

increased cough and congestion and increasing exertional dyspnea.  No recent 

travel history or sick contacts, no prior history of PE.  Patient does not have 

high blood pressure


MD Complaint: chest pain


-: week(s) (2)


Onset: during rest, during exertion


Pain Location: substernal, left chest


Pain Radiation: none


Severity: mild


Severity scale (1-10): 4


Quality: tightness, sharp (With cough)


Consistency: intermittent


Improves With: nothing


Worsens With: inspiration


Other Symptoms: cough


Treatments Prior to Arrival: none





- Related Data


 Home Medications











 Medication  Instructions  Recorded  Confirmed


 


Amitriptyline HCl 25 mg PO HS 10/19/18 10/19/18


 


Ranitidine HCl 150 mg PO HS 10/19/18 10/19/18











 Allergies











Allergy/AdvReac Type Severity Reaction Status Date / Time


 


No Known Allergies Allergy   Verified 04/29/18 16:07














Review of Systems


ROS Statement: 


Those systems with pertinent positive or pertinent negative responses have been 

documented in the HPI.





ROS Other: All systems not noted in ROS Statement are negative.





EKG Findings





- EKG Comments:


EKG Findings:: EKG shows sinus rhythm rate of 84, , QRS 86, QTc 432





Past Medical History


Past Medical History: Asthma, Chest Pain / Angina


History of Any Multi-Drug Resistant Organisms: None Reported


Past Surgical History: Tubal Ligation


Past Psychological History: Bipolar, Depression


Smoking Status: Current every day smoker


Past Alcohol Use History: None Reported


Past Drug Use History: None Reported





General Exam


Limitations: no limitations


General appearance: alert, in no apparent distress, anxious


Head exam: Present: atraumatic, normocephalic, normal inspection


Eye exam: Present: normal appearance, PERRL, EOMI.  Absent: scleral icterus, 

conjunctival injection, periorbital swelling


ENT exam: Present: normal exam, mucous membranes moist


Neck exam: Present: normal inspection.  Absent: tenderness, meningismus, 

lymphadenopathy


Respiratory exam: Present: normal lung sounds bilaterally, wheezes, accessory 

muscle use, decreased breath sounds, prolonged expiratory.  Absent: respiratory 

distress, rales, rhonchi, stridor


Cardiovascular Exam: Present: regular rate, normal rhythm, normal heart sounds.

  Absent: systolic murmur, diastolic murmur, rubs, gallop, clicks


GI/Abdominal exam: Present: soft, normal bowel sounds.  Absent: distended, 

tenderness, guarding, rebound, rigid


Extremities exam: Present: normal inspection, full ROM, normal capillary 

refill.  Absent: tenderness, pedal edema, joint swelling, calf tenderness


Back exam: Present: normal inspection


Neurological exam: Present: alert, oriented X3, CN II-XII intact


Psychiatric exam: Present: normal affect, normal mood


Skin exam: Present: warm, dry, intact, normal color.  Absent: rash





Course


 Vital Signs











  10/19/18





  14:05


 


Temperature 97.8 F


 


Pulse Rate 92


 


Respiratory 18





Rate 


 


Blood Pressure 113/79


 


O2 Sat by Pulse 100





Oximetry 














- Reevaluation(s)


Reevaluation #1: 





10/19/18 16:19


Medical records thoroughly reviewed, patient was seen in the emergency 

department last week for similar complaint, patient states she was scared for 

admission.


Reevaluation #2: 





10/19/18 16:19


Patient is continuous chest pain shortness of breath


Reevaluation #3: 





10/19/18 16:20


Chest x-rays negative for acute disease





Chest Pain MDM





- MDM





53 female the ER with atypical chest pain.  Patient is smoker with underlying 

COPD coming in with chest pain, patient will be admitted for breathing 

treatments secondary to COPD exacerbation, steroids, trending of troponin





Critical Care Time


Critical Care Time: Yes


Total Critical Care Time: 31





Disposition


Clinical Impression: 


 COPD exacerbation, Chest pain, Acute exacerbation of COPD with asthma





Disposition: ADMITTED AS IP TO THIS HOSP


Condition: Undetermined


Is patient prescribed a controlled substance at d/c from ED?: No


Referrals: 


Brendan Nassar Jr,  [Primary Care Provider] - 1-2 days

## 2018-10-20 LAB
ANION GAP SERPL CALC-SCNC: 9 MMOL/L
BASOPHILS # BLD AUTO: 0 K/UL (ref 0–0.2)
BASOPHILS NFR BLD AUTO: 0 %
BUN SERPL-SCNC: 16 MG/DL (ref 7–17)
CALCIUM SPEC-MCNC: 9.2 MG/DL (ref 8.4–10.2)
CHLORIDE SERPL-SCNC: 112 MMOL/L (ref 98–107)
CHOLEST SERPL-MCNC: 212 MG/DL (ref ?–200)
CK SERPL-CCNC: 28 U/L (ref 30–135)
CO2 SERPL-SCNC: 20 MMOL/L (ref 22–30)
EOSINOPHIL # BLD AUTO: 0 K/UL (ref 0–0.7)
EOSINOPHIL NFR BLD AUTO: 0 %
ERYTHROCYTE [DISTWIDTH] IN BLOOD BY AUTOMATED COUNT: 4.56 M/UL (ref 3.8–5.4)
ERYTHROCYTE [DISTWIDTH] IN BLOOD: 13.9 % (ref 11.5–15.5)
GLUCOSE BLD-MCNC: 144 MG/DL (ref 75–99)
GLUCOSE BLD-MCNC: 149 MG/DL (ref 75–99)
GLUCOSE BLD-MCNC: 162 MG/DL (ref 75–99)
GLUCOSE BLD-MCNC: 183 MG/DL (ref 75–99)
GLUCOSE SERPL-MCNC: 152 MG/DL (ref 74–99)
HCT VFR BLD AUTO: 42.4 % (ref 34–46)
HDLC SERPL-MCNC: 46 MG/DL (ref 40–60)
HGB BLD-MCNC: 13.6 GM/DL (ref 11.4–16)
LDLC SERPL CALC-MCNC: 155 MG/DL (ref 0–99)
LYMPHOCYTES # SPEC AUTO: 0.6 K/UL (ref 1–4.8)
LYMPHOCYTES NFR SPEC AUTO: 6 %
MCH RBC QN AUTO: 29.9 PG (ref 25–35)
MCHC RBC AUTO-ENTMCNC: 32.2 G/DL (ref 31–37)
MCV RBC AUTO: 93 FL (ref 80–100)
MONOCYTES # BLD AUTO: 0.1 K/UL (ref 0–1)
MONOCYTES NFR BLD AUTO: 1 %
NEUTROPHILS # BLD AUTO: 10 K/UL (ref 1.3–7.7)
NEUTROPHILS NFR BLD AUTO: 93 %
PLATELET # BLD AUTO: 308 K/UL (ref 150–450)
POTASSIUM SERPL-SCNC: 4.6 MMOL/L (ref 3.5–5.1)
SODIUM SERPL-SCNC: 141 MMOL/L (ref 137–145)
TRIGL SERPL-MCNC: 55 MG/DL (ref ?–150)
TROPONIN I SERPL-MCNC: <0.012 NG/ML (ref 0–0.03)
WBC # BLD AUTO: 10.7 K/UL (ref 3.8–10.6)

## 2018-10-20 RX ADMIN — CEFAZOLIN SCH MLS/HR: 330 INJECTION, POWDER, FOR SOLUTION INTRAMUSCULAR; INTRAVENOUS at 21:39

## 2018-10-20 RX ADMIN — IPRATROPIUM BROMIDE AND ALBUTEROL SULFATE SCH: .5; 3 SOLUTION RESPIRATORY (INHALATION) at 09:07

## 2018-10-20 RX ADMIN — INSULIN ASPART SCH UNIT: 100 INJECTION, SOLUTION INTRAVENOUS; SUBCUTANEOUS at 21:41

## 2018-10-20 RX ADMIN — MORPHINE SULFATE PRN MG: 4 INJECTION, SOLUTION INTRAMUSCULAR; INTRAVENOUS at 22:10

## 2018-10-20 RX ADMIN — BUDESONIDE AND FORMOTEROL FUMARATE DIHYDRATE SCH PUFF: 160; 4.5 AEROSOL RESPIRATORY (INHALATION) at 21:02

## 2018-10-20 RX ADMIN — CEFAZOLIN SCH MLS/HR: 330 INJECTION, POWDER, FOR SOLUTION INTRAMUSCULAR; INTRAVENOUS at 15:43

## 2018-10-20 RX ADMIN — ASPIRIN 325 MG ORAL TABLET SCH MG: 325 PILL ORAL at 09:09

## 2018-10-20 RX ADMIN — IPRATROPIUM BROMIDE AND ALBUTEROL SULFATE SCH ML: .5; 3 SOLUTION RESPIRATORY (INHALATION) at 16:10

## 2018-10-20 RX ADMIN — CEFAZOLIN SCH MLS/HR: 330 INJECTION, POWDER, FOR SOLUTION INTRAMUSCULAR; INTRAVENOUS at 02:29

## 2018-10-20 RX ADMIN — DOXYCYCLINE SCH MLS/HR: 100 INJECTION, POWDER, LYOPHILIZED, FOR SOLUTION INTRAVENOUS at 21:39

## 2018-10-20 RX ADMIN — MORPHINE SULFATE PRN MG: 4 INJECTION, SOLUTION INTRAMUSCULAR; INTRAVENOUS at 06:46

## 2018-10-20 RX ADMIN — METHYLPREDNISOLONE SODIUM SUCCINATE SCH MG: 125 INJECTION, POWDER, FOR SOLUTION INTRAMUSCULAR; INTRAVENOUS at 11:37

## 2018-10-20 RX ADMIN — INSULIN ASPART SCH UNIT: 100 INJECTION, SOLUTION INTRAVENOUS; SUBCUTANEOUS at 13:13

## 2018-10-20 RX ADMIN — AMITRIPTYLINE HYDROCHLORIDE SCH MG: 25 TABLET, FILM COATED ORAL at 21:39

## 2018-10-20 RX ADMIN — MORPHINE SULFATE PRN MG: 4 INJECTION, SOLUTION INTRAMUSCULAR; INTRAVENOUS at 02:28

## 2018-10-20 RX ADMIN — HEPARIN SODIUM SCH UNIT: 5000 INJECTION, SOLUTION INTRAVENOUS; SUBCUTANEOUS at 21:40

## 2018-10-20 RX ADMIN — METHYLPREDNISOLONE SODIUM SUCCINATE SCH MG: 40 INJECTION, POWDER, FOR SOLUTION INTRAMUSCULAR; INTRAVENOUS at 17:48

## 2018-10-20 RX ADMIN — IPRATROPIUM BROMIDE AND ALBUTEROL SULFATE SCH ML: .5; 3 SOLUTION RESPIRATORY (INHALATION) at 21:03

## 2018-10-20 RX ADMIN — INSULIN ASPART SCH UNIT: 100 INJECTION, SOLUTION INTRAVENOUS; SUBCUTANEOUS at 06:40

## 2018-10-20 RX ADMIN — IPRATROPIUM BROMIDE AND ALBUTEROL SULFATE SCH ML: .5; 3 SOLUTION RESPIRATORY (INHALATION) at 11:12

## 2018-10-20 RX ADMIN — Medication SCH MG: at 21:39

## 2018-10-20 RX ADMIN — METHYLPREDNISOLONE SODIUM SUCCINATE SCH MG: 125 INJECTION, POWDER, FOR SOLUTION INTRAMUSCULAR; INTRAVENOUS at 06:39

## 2018-10-20 RX ADMIN — MORPHINE SULFATE PRN MG: 4 INJECTION, SOLUTION INTRAMUSCULAR; INTRAVENOUS at 10:46

## 2018-10-20 RX ADMIN — IPRATROPIUM BROMIDE AND ALBUTEROL SULFATE SCH ML: .5; 3 SOLUTION RESPIRATORY (INHALATION) at 03:22

## 2018-10-20 RX ADMIN — INSULIN ASPART SCH UNIT: 100 INJECTION, SOLUTION INTRAVENOUS; SUBCUTANEOUS at 17:48

## 2018-10-20 RX ADMIN — METHYLPREDNISOLONE SODIUM SUCCINATE SCH MG: 40 INJECTION, POWDER, FOR SOLUTION INTRAMUSCULAR; INTRAVENOUS at 23:51

## 2018-10-20 RX ADMIN — FAMOTIDINE SCH MG: 20 TABLET, FILM COATED ORAL at 21:39

## 2018-10-20 NOTE — P.HPIM
History of Present Illness





This is a pleasant 53 years old female with past medical history of asthma, COPD

, coronary artery disease, fibromyalgia, GERD, depression, who presents because 

of chest pain on the right side of 3 weeks duration.  Patient patient's have 

symptoms of upper respiratory infections about 2 weeks ago after that her right-

sided chest pain got worse it's on the right side side radiating to the back 

sometimes is got worse with coughing and change of movement.  Patient also 

complains with mild dyspnea and cough with brown sputum.


In the emergency room patient had mild leukocytosis at 10.7.  Hemoglobin and 

platelets were stable.  Electrolytes were fine.  Creatinine 0.65.  Chest x-ray 

shows no acute cardiopulmonary process





Review of Systems





CONSTITUTIONAL: No fever, no malaise, no fatigue. 


HEENT: No recent visual problems or hearing problems. Denied any sore throat. 


CARDIOVASCULAR: No  orthopnea, PND, no palpitations, no syncope. 


PULMONARY: No shortness of breath, no cough, no hemoptysis. 


GASTROINTESTINAL: No diarrhea, no nausea, no vomiting, no abdominal pain. 

Normoactive bowel sounds. 


NEUROLOGICAL: No headaches, no weakness, no numbness. 


HEMATOLOGICAL: Denies any bleeding or petechiae. 


GENITOURINARY: Denies any burning micturition, frequency, or urgency. 


MUSCULOSKELETAL/RHEUMATOLOGICAL: Denies any joint pain, swelling, or any muscle 

pain. 


ENDOCRINE: Denies any polyuria or polydipsia.











Past Medical History


Past Medical History: Asthma, Chest Pain / Angina, COPD, Fibromyalgia, GERD/

Reflux


Additional Past Medical History / Comment(s): bipolar depression, "insomnia"


History of Any Multi-Drug Resistant Organisms: None Reported


Past Surgical History: Tubal Ligation


Past Anesthesia/Blood Transfusion Reactions: No Reported Reaction


Additional Past Anesthesia/Blood Transfusion Reaction / Comment(s): 

clausterphobia. pt stated she has never has a blood transfusion


Smoking Status: Current every day smoker





- Past Family History


  ** Mother


Additional Family Medical History / Comment(s): bipolar depression





  ** Father


Family Medical History: Cancer, Myocardial Infarction (MI)





Medications and Allergies


 Home Medications











 Medication  Instructions  Recorded  Confirmed  Type


 


Amitriptyline HCl 25 mg PO HS 10/19/18 10/19/18 History


 


Ranitidine HCl 150 mg PO HS 10/19/18 10/19/18 History











 Allergies











Allergy/AdvReac Type Severity Reaction Status Date / Time


 


No Known Allergies Allergy   Verified 04/29/18 16:07














Physical Exam


Vitals: 


 Vital Signs











  Temp Pulse Pulse Resp BP BP Pulse Ox


 


 10/20/18 11:30  98.0 F   94  18   122/75  91 L


 


 10/20/18 11:23   80     


 


 10/20/18 11:12   80     


 


 10/20/18 07:45  98.0 F   81  18   100/59  94 L


 


 10/20/18 03:59    70  17   


 


 10/20/18 03:58  97.8 F   70  17   96/52  94 L


 


 10/20/18 03:33   78   16   


 


 10/20/18 03:23   76   16   


 


 10/19/18 23:47   80   16   


 


 10/19/18 23:39   78  82  16   


 


 10/19/18 23:36  97.7 F   82  17   114/74  92 L


 


 10/19/18 20:00  98.2 F   89  17   109/51 


 


 10/19/18 19:43   81   16   


 


 10/19/18 19:32   82   16    97


 


 10/19/18 19:06  97.8 F  92   18  113/79   100


 


 10/19/18 19:04  97.8 F  92   18  113/79   100


 


 10/19/18 18:42  97.8 F  92   18  113/79   100


 


 10/19/18 18:30   75   20  122/76   95


 


 10/19/18 18:00      98/83  


 


 10/19/18 17:30   73   14  89/77   91 L


 


 10/19/18 17:00   78   17  108/80   93 L


 


 10/19/18 16:30   86   12  100/72   95








 Intake and Output











 10/20/18 10/20/18 10/20/18





 06:59 14:59 22:59


 


Intake Total 1150 240 


 


Balance 1150 240 


 


Intake:   


 


  Intake, IV Titration 1000  





  Amount   


 


    Sodium Chloride 0.9% 1, 1000  





    000 ml @ 100 mls/hr IV .   





    Q10H [facility] Rx#:[id number]   


 


  Oral 150 240 


 


Other:   


 


  Voiding Method Toilet  


 


  # Voids 2  2


 


  Weight 93 kg 93 kg 














GENERAL: The patient is alert and oriented x3, not in any acute distress. Well 

developed, well nourished. 


HEENT: Pupils are round and equally reacting to light. EOMI. No scleral 

icterus. No conjunctival pallor. Normocephalic, atraumatic. No pharyngeal 

erythema. No thyromegaly. 


CARDIOVASCULAR: S1 and S2 present. No murmurs, rubs, or gallops. 


PULMONARY: Chest is clear to auscultation, no wheezing or crackles. 


ABDOMEN: Soft, nontender, nondistended, normoactive bowel sounds. No palpable 

organomegaly. 


MUSCULOSKELETAL: No joint swelling or deformity. 


EXTREMITIES: No cyanosis, clubbing, or pedal edema. 


NEUROLOGICAL: Gross neurological examination did not reveal any focal deficits. 


SKIN: No rashes.





Results


CBC & Chem 7: 


 10/20/18 06:30





 10/20/18 06:30


Labs: 


 Abnormal Lab Results - Last 24 Hours (Table)











  10/19/18 10/20/18 10/20/18 Range/Units





  20:47 02:22 02:22 


 


WBC     (3.8-10.6)  k/uL


 


Neutrophils #     (1.3-7.7)  k/uL


 


Lymphocytes #     (1.0-4.8)  k/uL


 


Chloride     ()  mmol/L


 


Carbon Dioxide     (22-30)  mmol/L


 


Glucose     (74-99)  mg/dL


 


POC Glucose (mg/dL)  156 H    (75-99)  mg/dL


 


Total Creatine Kinase   28 L   ()  U/L


 


Cholesterol    212 H  (<200)  mg/dL


 


LDL Cholesterol, Calc    155 H  (0-99)  mg/dL














  10/20/18 10/20/18 10/20/18 Range/Units





  06:04 06:30 06:30 


 


WBC   10.7 H   (3.8-10.6)  k/uL


 


Neutrophils #   10.0 H   (1.3-7.7)  k/uL


 


Lymphocytes #   0.6 L   (1.0-4.8)  k/uL


 


Chloride    112 H  ()  mmol/L


 


Carbon Dioxide    20 L  (22-30)  mmol/L


 


Glucose    152 H  (74-99)  mg/dL


 


POC Glucose (mg/dL)  149 H    (75-99)  mg/dL


 


Total Creatine Kinase     ()  U/L


 


Cholesterol     (<200)  mg/dL


 


LDL Cholesterol, Calc     (0-99)  mg/dL














  10/20/18 Range/Units





  11:40 


 


WBC   (3.8-10.6)  k/uL


 


Neutrophils #   (1.3-7.7)  k/uL


 


Lymphocytes #   (1.0-4.8)  k/uL


 


Chloride   ()  mmol/L


 


Carbon Dioxide   (22-30)  mmol/L


 


Glucose   (74-99)  mg/dL


 


POC Glucose (mg/dL)  183 H  (75-99)  mg/dL


 


Total Creatine Kinase   ()  U/L


 


Cholesterol   (<200)  mg/dL


 


LDL Cholesterol, Calc   (0-99)  mg/dL














Assessment and Plan


Assessment: 





Pleuritic chest pain, versus musculoskeletal sore combination


Possible COPD in acute exacerbation


History of coronary artery disease


Hypertension


Hyperlipidemia


History of depression





Plan: 





This is a pleasant 53 years old female who presents because of pleuritic chest 

pain and possible COPD.  Labs and medication reviews.  Continue with steroids, 

breathing treatments and oxygen  Continue with same treatment.  Continue 

symptomatic treatment.  Resume home medication.  GI and DVT prophylaxis.  

Pulmonary consultation is appreciated.  Continue with antibiotics and pain 

management.  Further recommendations of the clinical course of the patient


DVT prophylaxis: Subcutaneous heparin


GI prophylaxis: Pepcid


Prognosis is guarded

## 2018-10-20 NOTE — P.CNPUL
History of Present Illness


Consult date: 10/20/18


Reason for consult: dyspnea, hypoxemia, abnormal CXR/CT


Chief complaint: Shortness of breath/CHF


History of present illness: 





Pulmonary consult dated 10/20/2018





53-year-old female who looks much older than her stated age, who presents to 

the emergency room with complaints of shortness of breath and chest pain.  The 

pain is sharp.  Some of the pain is reproducible and may represent 

musculoskeletal chest wall pain.  In addition, the shortness of breath seems to 

be worse when she takes a deep breath.  The pain is sharp.  It may be pleuritic 

in nature.  She denies trauma to the chest.  She may have some underlying COPD 

from heavy tobacco use.  She tells us that she is down to 4  cigarettes a day.  

The patient was admitted on the 19th.  Although her med list.  Does not reflect 

that, her home medications include Symbicort inhaler and a short acting beta 

agonist may be Ventolin.  The patient has a history of angina, and asthma/COPD.

  She also suffers from bipolar disorder depression and has had a tubal 

ligation.  She continues to smoke but is pared down her smoking to 4 cigarettes 

a day.  Her chest x-ray is unremarkable.  There is no evidence of pneumonia or 

heart failure.  She was admitted with a diagnosis of COPD exacerbation but in 

actuality, I think her issues related to musculoskeletal chest wall pain and 

more likely pleurisy.  There may be a small component of COPD exacerbation.  In 

addition, pulmonary embolism should be ruled out.





Review of Systems





A 14 point review of system is positive for chest pain.  The pain is sharp and 

worse on deep breathing and consistent with a pleuritic etiology.  In addition, 

some of her pain is musculoskeletal in origin in that is reproducible.  She 

denies cough or phlegm production.





Past Medical History


Past Medical History: Asthma, Chest Pain / Angina, COPD, Fibromyalgia, GERD/

Reflux


Additional Past Medical History / Comment(s): bipolar depression, "insomnia"


History of Any Multi-Drug Resistant Organisms: None Reported


Past Surgical History: Tubal Ligation


Past Anesthesia/Blood Transfusion Reactions: No Reported Reaction


Additional Past Anesthesia/Blood Transfusion Reaction / Comment(s): 

clausterphobia. pt stated she has never has a blood transfusion


Smoking Status: Current every day smoker





- Past Family History


  ** Mother


Additional Family Medical History / Comment(s): bipolar depression





  ** Father


Family Medical History: Cancer, Myocardial Infarction (MI)





Medications and Allergies


 Home Medications











 Medication  Instructions  Recorded  Confirmed  Type


 


Amitriptyline HCl 25 mg PO HS 10/19/18 10/19/18 History


 


Ranitidine HCl 150 mg PO HS 10/19/18 10/19/18 History











 Allergies











Allergy/AdvReac Type Severity Reaction Status Date / Time


 


No Known Allergies Allergy   Verified 04/29/18 16:07














Physical Exam


Osteopathic Statement: *.  No significant issues noted on an osteopathic 

structural exam other than those noted in the History and Physical/Consult.


Vitals: 


 Vital Signs











  Temp Pulse Pulse Resp BP BP Pulse Ox


 


 10/20/18 11:30  98.0 F   94  18   122/75  91 L


 


 10/20/18 11:23   80     


 


 10/20/18 11:12   80     


 


 10/20/18 07:45  98.0 F   81  18   100/59  94 L


 


 10/20/18 03:59    70  17   


 


 10/20/18 03:58  97.8 F   70  17   96/52  94 L


 


 10/20/18 03:33   78   16   


 


 10/20/18 03:23   76   16   


 


 10/19/18 23:47   80   16   


 


 10/19/18 23:39   78  82  16   


 


 10/19/18 23:36  97.7 F   82  17   114/74  92 L


 


 10/19/18 20:00  98.2 F   89  17   109/51 


 


 10/19/18 19:43   81   16   


 


 10/19/18 19:32   82   16    97


 


 10/19/18 19:06  97.8 F  92   18  113/79   100


 


 10/19/18 19:04  97.8 F  92   18  113/79   100


 


 10/19/18 18:42  97.8 F  92   18  113/79   100


 


 10/19/18 18:30   75   20  122/76   95


 


 10/19/18 18:00      98/83  


 


 10/19/18 17:30   73   14  89/77   91 L


 


 10/19/18 17:00   78   17  108/80   93 L


 


 10/19/18 16:30   86   12  100/72   95


 


 10/19/18 16:00   81   22  107/78   95


 


 10/19/18 15:30   83   8 L  109/91   91 L


 


 10/19/18 15:00   86   20  104/71   95


 


 10/19/18 14:30   80   19  104/71   97


 


 10/19/18 14:18   80   16    97


 


 10/19/18 14:05  97.8 F  92   18  113/79   100








 Intake and Output











 10/19/18 10/20/18 10/20/18





 22:59 06:59 14:59


 


Intake Total 400 1150 240


 


Balance 400 1150 240


 


Intake:   


 


  Intake, IV Titration  1000 





  Amount   


 


    Sodium Chloride 0.9% 1,  1000 





    000 ml @ 100 mls/hr IV .   





    Q10H [facility] Rx#:[id number]   


 


  Oral 400 150 240


 


Other:   


 


  Voiding Method Toilet Toilet 


 


  # Voids  2 


 


  Weight  93 kg 93 kg














No acute distress, oriented 3.  No audible wheezing.  No oliverio respiratory 

distress.





HEENT examination is grossly unremarkable.  Mucous membranes are moist.  No 

oral lesions.





Neck supple.  Full range of motion.  No adenopathy thyromegaly or neck vein 

distention.





Cardiovascular examination reveals regular rhythm rate.  S1-S2 normal.  No S3 

or S4.  No discernible murmur noted.





Lungs reveal mostly clear breath sounds.  There may be a hint of some 

expiratory rhonchi.  No wheezes or crackles.  Breath sounds equal bilaterally.  

In addition, some of her chest pain is reproducible.  No pleural friction rub 

heard.





Abdomen soft bowel sounds are heard.  No masses or tenderness.





Extremities are intact.  No cyanosis clubbing or edema.





Skin is without rash or lesion.





Neurologic examination is brief but nonfocal.





Results





- Laboratory Findings


CBC and BMP: 


 10/20/18 06:30





 10/20/18 06:30


PT/INR, D-dimer











PT  9.4 sec (9.0-12.0)   10/19/18  14:30    


 


INR  0.9  (<1.2)   10/19/18  14:30    


 


D-Dimer  0.40 mg/L FEU (<0.60)   10/19/18  14:30    








Abnormal lab findings: 


 Abnormal Labs











  10/19/18 10/19/18 10/19/18





  14:30 14:30 20:47


 


WBC   


 


Hct  47.6 H  


 


Neutrophils #   


 


Lymphocytes #   


 


Chloride   110 H 


 


Carbon Dioxide   


 


Glucose   


 


POC Glucose (mg/dL)    156 H


 


Total Creatine Kinase   


 


Cholesterol   


 


LDL Cholesterol, Calc   














  10/20/18 10/20/18 10/20/18





  02:22 02:22 06:04


 


WBC   


 


Hct   


 


Neutrophils #   


 


Lymphocytes #   


 


Chloride   


 


Carbon Dioxide   


 


Glucose   


 


POC Glucose (mg/dL)    149 H


 


Total Creatine Kinase  28 L  


 


Cholesterol   212 H 


 


LDL Cholesterol, Calc   155 H 














  10/20/18 10/20/18 10/20/18





  06:30 06:30 11:40


 


WBC  10.7 H  


 


Hct   


 


Neutrophils #  10.0 H  


 


Lymphocytes #  0.6 L  


 


Chloride   112 H 


 


Carbon Dioxide   20 L 


 


Glucose   152 H 


 


POC Glucose (mg/dL)    183 H


 


Total Creatine Kinase   


 


Cholesterol   


 


LDL Cholesterol, Calc   














- Diagnostic Findings


Chest x-ray: report reviewed, image reviewed (Chest x-ray, labs, and 

medications are all reviewed.)





Assessment and Plan


Assessment: 





Assessment





Chest pain, sharp, worse with deep breathing, and likely consistent with 

pleurisy.  In addition, some of the pain is reproducible and likely relates to 

musculoskeletal chest wall syndrome





Rule out mild COPD exacerbation





Ongoing tobacco use with nicotine addiction





History of angina/chest pain





Previous history of tubal ligation





History of bipolar disorder





History of depression





History of ongoing tobacco use with nicotine addiction


Plan: 





Plan dated 10/20/2018





Her medications are reviewed.  We can resume her usual breathing medications.  

I would use some nonsteroid anti-inflammatory drug all all with some steroids 

for her musculoskeletal chest wall syndrome as well as or pleurisy.  Additional 

recommendations and suggestions are forthcoming.  We will continue to follow.  

Prognosis is guarded.  She is counseled about the importance of smoking 

cessation.


Time with Patient: Greater than 30

## 2018-10-21 VITALS — RESPIRATION RATE: 18 BRPM

## 2018-10-21 VITALS — SYSTOLIC BLOOD PRESSURE: 133 MMHG | HEART RATE: 88 BPM | DIASTOLIC BLOOD PRESSURE: 82 MMHG | TEMPERATURE: 98.4 F

## 2018-10-21 LAB
ANION GAP SERPL CALC-SCNC: 6 MMOL/L
BASOPHILS # BLD AUTO: 0 K/UL (ref 0–0.2)
BASOPHILS NFR BLD AUTO: 0 %
BUN SERPL-SCNC: 14 MG/DL (ref 7–17)
CALCIUM SPEC-MCNC: 8.8 MG/DL (ref 8.4–10.2)
CHLORIDE SERPL-SCNC: 113 MMOL/L (ref 98–107)
CO2 SERPL-SCNC: 21 MMOL/L (ref 22–30)
EOSINOPHIL # BLD AUTO: 0 K/UL (ref 0–0.7)
EOSINOPHIL NFR BLD AUTO: 0 %
ERYTHROCYTE [DISTWIDTH] IN BLOOD BY AUTOMATED COUNT: 4.24 M/UL (ref 3.8–5.4)
ERYTHROCYTE [DISTWIDTH] IN BLOOD: 14.2 % (ref 11.5–15.5)
GLUCOSE BLD-MCNC: 138 MG/DL (ref 75–99)
GLUCOSE BLD-MCNC: 168 MG/DL (ref 75–99)
GLUCOSE SERPL-MCNC: 139 MG/DL (ref 74–99)
HCT VFR BLD AUTO: 39.4 % (ref 34–46)
HGB BLD-MCNC: 12.7 GM/DL (ref 11.4–16)
LYMPHOCYTES # SPEC AUTO: 0.9 K/UL (ref 1–4.8)
LYMPHOCYTES NFR SPEC AUTO: 5 %
MCH RBC QN AUTO: 30 PG (ref 25–35)
MCHC RBC AUTO-ENTMCNC: 32.3 G/DL (ref 31–37)
MCV RBC AUTO: 92.9 FL (ref 80–100)
MONOCYTES # BLD AUTO: 0.5 K/UL (ref 0–1)
MONOCYTES NFR BLD AUTO: 2 %
NEUTROPHILS # BLD AUTO: 17.9 K/UL (ref 1.3–7.7)
NEUTROPHILS NFR BLD AUTO: 93 %
PLATELET # BLD AUTO: 316 K/UL (ref 150–450)
POTASSIUM SERPL-SCNC: 4.7 MMOL/L (ref 3.5–5.1)
SODIUM SERPL-SCNC: 140 MMOL/L (ref 137–145)
WBC # BLD AUTO: 19.3 K/UL (ref 3.8–10.6)

## 2018-10-21 RX ADMIN — IPRATROPIUM BROMIDE AND ALBUTEROL SULFATE SCH: .5; 3 SOLUTION RESPIRATORY (INHALATION) at 09:15

## 2018-10-21 RX ADMIN — ASPIRIN 325 MG ORAL TABLET SCH MG: 325 PILL ORAL at 10:29

## 2018-10-21 RX ADMIN — INSULIN ASPART SCH UNIT: 100 INJECTION, SOLUTION INTRAVENOUS; SUBCUTANEOUS at 06:44

## 2018-10-21 RX ADMIN — BUDESONIDE AND FORMOTEROL FUMARATE DIHYDRATE SCH: 160; 4.5 AEROSOL RESPIRATORY (INHALATION) at 09:15

## 2018-10-21 RX ADMIN — BUDESONIDE AND FORMOTEROL FUMARATE DIHYDRATE SCH PUFF: 160; 4.5 AEROSOL RESPIRATORY (INHALATION) at 09:04

## 2018-10-21 RX ADMIN — DOXYCYCLINE SCH MLS/HR: 100 INJECTION, POWDER, LYOPHILIZED, FOR SOLUTION INTRAVENOUS at 11:37

## 2018-10-21 RX ADMIN — IPRATROPIUM BROMIDE AND ALBUTEROL SULFATE SCH ML: .5; 3 SOLUTION RESPIRATORY (INHALATION) at 09:04

## 2018-10-21 RX ADMIN — IPRATROPIUM BROMIDE AND ALBUTEROL SULFATE SCH ML: .5; 3 SOLUTION RESPIRATORY (INHALATION) at 12:54

## 2018-10-21 RX ADMIN — IPRATROPIUM BROMIDE AND ALBUTEROL SULFATE SCH ML: .5; 3 SOLUTION RESPIRATORY (INHALATION) at 04:01

## 2018-10-21 RX ADMIN — INSULIN ASPART SCH UNIT: 100 INJECTION, SOLUTION INTRAVENOUS; SUBCUTANEOUS at 12:31

## 2018-10-21 RX ADMIN — HEPARIN SODIUM SCH UNIT: 5000 INJECTION, SOLUTION INTRAVENOUS; SUBCUTANEOUS at 10:29

## 2018-10-21 RX ADMIN — METHYLPREDNISOLONE SODIUM SUCCINATE SCH MG: 40 INJECTION, POWDER, FOR SOLUTION INTRAMUSCULAR; INTRAVENOUS at 06:44

## 2018-10-21 RX ADMIN — MORPHINE SULFATE PRN MG: 4 INJECTION, SOLUTION INTRAMUSCULAR; INTRAVENOUS at 08:12

## 2018-10-21 RX ADMIN — MORPHINE SULFATE PRN MG: 4 INJECTION, SOLUTION INTRAMUSCULAR; INTRAVENOUS at 04:02

## 2018-10-21 RX ADMIN — IPRATROPIUM BROMIDE AND ALBUTEROL SULFATE SCH ML: .5; 3 SOLUTION RESPIRATORY (INHALATION) at 00:21

## 2018-10-21 RX ADMIN — CEFAZOLIN SCH MLS/HR: 330 INJECTION, POWDER, FOR SOLUTION INTRAMUSCULAR; INTRAVENOUS at 10:29

## 2018-10-21 RX ADMIN — METHYLPREDNISOLONE SODIUM SUCCINATE SCH MG: 40 INJECTION, POWDER, FOR SOLUTION INTRAMUSCULAR; INTRAVENOUS at 11:37

## 2018-10-21 NOTE — P.DS
Providers


Date of admission: 


10/19/18 16:10





Attending physician: 


Ant Campbell MD





Consults: 





 





10/19/18 16:56


Consult Physician Routine 


   Consulting Provider: Harmeet Dang


   Consult Reason/Comments: asthma


   Do you want consulting provider notified?: Yes











Primary care physician: 


[facility] Course: 


Patient is admitted for COPD exacerbation and was closely the chest pain.  

Patient will be discharged today patient was asked to take nonsteroidal anti-

inflammatories for musculoskeletal pain and will be set discharged on systemic 

steroids with tapering doses for COPD exacerbation.





PHYSICAL EXAMINATION: 





GENERAL: The patient is alert and oriented x3, not in any acute distress. Well 

developed, well nourished. 


HEENT: Pupils are round and equally reacting to light. EOMI. No scleral 

icterus. No conjunctival pallor. Normocephalic, atraumatic. No pharyngeal 

erythema. No thyromegaly. 


CARDIOVASCULAR: S1 and S2 present. No murmurs, rubs, or gallops. 


PULMONARY: Chest is clear to auscultation, no wheezing or crackles. 


ABDOMEN: Soft, nontender, nondistended, normoactive bowel sounds. No palpable 

organomegaly. 


MUSCULOSKELETAL: No joint swelling or deformity.


EXTREMITIES: No cyanosis, clubbing, or pedal edema. 


NEUROLOGICAL: Gross neurological examination did not reveal any focal deficits. 


SKIN: No rashes. 





-COPD exacerbation


-Musculoskeletal chest pain


-Hypertension


-Hyperlipidemia


-Depression


Patient Condition at Discharge: Undetermined





Plan - Discharge Summary


Discharge Rx Participant: No


New Discharge Prescriptions: 


New


   Doxycycline Monohydrate [Monodox] 100 mg PO BID 3 Days #6 cap


   predniSONE 10 mg PO DAILY #30 tab





No Action


   Ranitidine HCl 150 mg PO HS


   Amitriptyline HCl 25 mg PO HS


Discharge Medication List





Amitriptyline HCl 25 mg PO HS 10/19/18 [History]


Ranitidine HCl 150 mg PO HS 10/19/18 [History]


Doxycycline Monohydrate [Monodox] 100 mg PO BID 3 Days #6 cap 10/21/18 [Rx]


predniSONE 10 mg PO DAILY #30 tab 10/21/18 [Rx]








Follow up Appointment(s)/Referral(s): 


Esthela Chan FNPBC [REFERRING] - 1 Week (Call and make an appointment 

Monday.)


Discharge Disposition: HOME SELF-CARE

## 2018-10-21 NOTE — P.PN
Subjective


Progress Note Date: 10/21/18


Principal diagnosis: 





Pleuritic chest pain, musculoskeletal chest wall pain





Progress note dated 10/21/2018





This is a 53-year-old female who presents to the emergency department with 

complaints of sharp chest pain which is worsened by deep breathing.  The pain 

clearly has a pleuritic nature to it.  In addition, the pain in the anterior 

chest is somewhat reproducible.  When I saw her yesterday in consultation, I 

felt that her primary problem was primarily pleurisy and/or musculoskeletal 

chest wall syndrome.  She denies any trauma to the chest.  She also may have 

some underlying COPD from heavy tobacco use.  She apparently may be discharged 

today.  That up to the hospital service.  In addition, the patient has a 

history of angina, bipolar disorder, depression, and tubal ligation.  The 

patient is feeling much better today with nonsteroid anti-inflammatory 

medications as well as steroids.





Objective





- Vital Signs


Vital signs: 


 Vital Signs











Temp  98.3 F   10/21/18 08:00


 


Pulse  80   10/21/18 09:19


 


Resp  18   10/21/18 08:00


 


BP  101/60   10/21/18 08:00


 


Pulse Ox  94 L  10/21/18 08:00








 Intake & Output











 10/20/18 10/21/18 10/21/18





 18:59 06:59 18:59


 


Intake Total 462 1900 180


 


Output Total  2 


 


Balance 462 1898 180


 


Weight 93 kg 95.2 kg 


 


Intake:   


 


  Intake, IV Titration  1200 





  Amount   


 


    Sodium Chloride 0.9% 1,  1200 





    000 ml @ 100 mls/hr IV .   





    Q10H [facility] Rx#:[id number]   


 


  Oral 462 700 180


 


Output:   


 


  Urine  2 


 


Other:   


 


  Voiding Method  Toilet 


 


  # Voids 2 1 














- Exam





No acute distress, oriented 3.  No audible wheezing.  No oliverio respiratory 

distress.  Not requiring any oxygen therapy.





HEENT examination is grossly unremarkable.  Mucous membranes are moist.  No 

oral lesions.





Neck supple.  Full range of motion.  No adenopathy thyromegaly or neck vein 

distention.





Cardiovascular examination reveals regular rhythm rate.  S1-S2 normal.  No S3 

or S4.  No discernible murmur noted.





Lungs reveal mostly clear breath sounds.  No significant wheezes rhonchi or 

crackles appreciated.  Breath sounds equal bilaterally.  Mild prolongation on 

forced maneuver.  No pleural friction rub noted.  Mild tenderness noted on 

palpation to the anterior chest.





Abdomen soft bowel sounds are heard.  No masses or tenderness.





Extremities are intact.  No cyanosis clubbing or edema.





Skin is without rash or lesion.





Neurologic examination is brief but nonfocal.





- Labs


CBC & Chem 7: 


 10/21/18 06:38





 10/21/18 06:38


Labs: 


 Abnormal Lab Results - Last 24 Hours (Table)











  10/20/18 10/20/18 10/21/18 Range/Units





  16:25 20:33 05:30 


 


WBC     (3.8-10.6)  k/uL


 


Neutrophils #     (1.3-7.7)  k/uL


 


Lymphocytes #     (1.0-4.8)  k/uL


 


Chloride     ()  mmol/L


 


Carbon Dioxide     (22-30)  mmol/L


 


Glucose     (74-99)  mg/dL


 


POC Glucose (mg/dL)  144 H  162 H  168 H  (75-99)  mg/dL














  10/21/18 10/21/18 Range/Units





  06:38 06:38 


 


WBC  19.3 H   (3.8-10.6)  k/uL


 


Neutrophils #  17.9 H   (1.3-7.7)  k/uL


 


Lymphocytes #  0.9 L   (1.0-4.8)  k/uL


 


Chloride   113 H  ()  mmol/L


 


Carbon Dioxide   21 L  (22-30)  mmol/L


 


Glucose   139 H  (74-99)  mg/dL


 


POC Glucose (mg/dL)    (75-99)  mg/dL














Assessment and Plan


Assessment: 





Assessment





Chest pain, sharp, worse with deep breathing, and likely consistent with 

pleurisy.  In addition, some of the pain is reproducible and likely relates to 

musculoskeletal chest wall syndrome





Rule out mild COPD exacerbation





Ongoing tobacco use with nicotine addiction





History of angina/chest pain





Previous history of tubal ligation





History of bipolar disorder





History of depression





History of ongoing tobacco use with nicotine addiction


Plan: 





Plan dated 10/20/2018





Her medications are reviewed.  We can resume her usual breathing medications.  

I would use some nonsteroid anti-inflammatory drug all all with some steroids 

for her musculoskeletal chest wall syndrome as well as or pleurisy.  Additional 

recommendations and suggestions are forthcoming.  We will continue to follow.  

Prognosis is guarded.  She is counseled about the importance of smoking 

cessation.





Plan dated 10/21/2018





The patient's medications are reviewed.  Lab data is reviewed.  The patient can 

resume her usual breathing medications as mentioned yesterday.  I recommended 

both steroids and nonsteroid anti-inflammatory drugs for it appears to be 

musculoskeletal chest wall syndrome as well as pleurisy.  She's counseled about 

the importance of smoking cessation.  I told her I be happy to see her in the 

office to better evaluate her chronic obstructive pulmonary disease.  Her 

prognosis is guarded.  Laboratory data from today includes a white count of 19.3

, hemoglobin 12.7, hematocrit 39.4 and a normal platelet count.  Sodium and 

potassium were normal.  Chlorides was 113 with a CO2 of 21.  Anion gap was 

normal as was renal function as reflected normal BUN and creatinine levels.  

Chest x-ray from October 19 shows no acute cardiopulmonary disease.


Time with Patient: Less than 30

## 2020-01-06 ENCOUNTER — HOSPITAL ENCOUNTER (EMERGENCY)
Dept: HOSPITAL 47 - EC | Age: 55
Discharge: HOME | End: 2020-01-06
Payer: COMMERCIAL

## 2020-01-06 VITALS — SYSTOLIC BLOOD PRESSURE: 135 MMHG | DIASTOLIC BLOOD PRESSURE: 80 MMHG | HEART RATE: 73 BPM

## 2020-01-06 VITALS — RESPIRATION RATE: 20 BRPM

## 2020-01-06 VITALS — TEMPERATURE: 98.2 F

## 2020-01-06 DIAGNOSIS — S83.92XA: Primary | ICD-10-CM

## 2020-01-06 DIAGNOSIS — F17.200: ICD-10-CM
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
## 2020-01-06 DIAGNOSIS — Z79.899: ICD-10-CM

## 2020-01-06 DIAGNOSIS — S93.602A: ICD-10-CM

## 2020-01-06 DIAGNOSIS — K21.9: ICD-10-CM

## 2020-01-06 PROCEDURE — 73080 X-RAY EXAM OF ELBOW: CPT

## 2020-01-06 PROCEDURE — 73562 X-RAY EXAM OF KNEE 3: CPT

## 2020-01-06 PROCEDURE — 99283 EMERGENCY DEPT VISIT LOW MDM: CPT

## 2020-01-06 PROCEDURE — 73630 X-RAY EXAM OF FOOT: CPT

## 2020-01-06 PROCEDURE — 96372 THER/PROPH/DIAG INJ SC/IM: CPT

## 2020-01-06 NOTE — XR
EXAMINATION TYPE: XR knee complete LT

 

DATE OF EXAM: 1/6/2020

 

COMPARISON: NONE

 

HISTORY: Knee pain

 

TECHNIQUE: 3 views

 

FINDINGS: I see no fracture nor dislocation. Joint spaces are normal. There is no sign of knee joint 
effusion.

 

IMPRESSION: Negative left knee exam.

## 2020-01-06 NOTE — XR
EXAMINATION TYPE: XR foot complete RT

 

DATE OF EXAM: 1/6/2020

 

COMPARISON: NONE

 

HISTORY: Foot pain

 

TECHNIQUE: 3 views

 

FINDINGS: Metatarsals are intact. I see no fracture nor dislocation. Joint spaces are fairly normal.

 

IMPRESSION: Negative right foot exam.

## 2020-01-06 NOTE — ED
Fall HPI





- General


Chief Complaint: Fall


Stated Complaint: lt knee injury


Time Seen by Provider: 01/06/20 16:59


Source: patient


Mode of arrival: wheelchair





- History of Present Illness


Initial Comments: 


54-year-old female patient presents to the emergency department today for 

evaluation of multiple injuries after experiencing a fall down approximately 4 

steps.  Patient states injury occurred around 1:30 this afternoon.  States that 

she is having pain to the right elbow, left knee, and right foot.  States the 

left knee pain is the worst.  She states she is unable to bend it due to severe 

pain with movement.  States that she is also having difficulty ambulating due to

the pain.  She denies hitting her head or losing consciousness during the 

injury.  Denies any neck or back pain.  She denies use of anticoagulant 

medications.  Denies numbness or tingling to the extremities. Patient denies any

headache, chest pain, shortness of breath, dizziness, weakness, abdominal pain, 

nausea, vomiting, or difficulties with bowel movements or urination.








- Related Data


                                Home Medications











 Medication  Instructions  Recorded  Confirmed


 


Amitriptyline HCl 25 mg PO HS 10/19/18 10/19/18


 


Ranitidine HCl 150 mg PO HS 10/19/18 10/19/18








                                  Previous Rx's











 Medication  Instructions  Recorded


 


Doxycycline Monohydrate [Monodox] 100 mg PO BID 3 Days #6 cap 10/21/18


 


predniSONE 10 mg PO DAILY #30 tab 10/21/18


 


Ibuprofen [Motrin] 600 mg PO Q8HR PRN #30 tab 01/06/20











                                    Allergies











Allergy/AdvReac Type Severity Reaction Status Date / Time


 


No Known Allergies Allergy   Verified 01/06/20 16:55














Review of Systems


ROS Statement: 


Those systems with pertinent positive or pertinent negative responses have been 

documented in the HPI.





ROS Other: All systems not noted in ROS Statement are negative.





Past Medical History


Past Medical History: Asthma, Chest Pain / Angina, COPD, Fibromyalgia, 

GERD/Reflux


Additional Past Medical History / Comment(s): bipolar depression, "insomnia"


History of Any Multi-Drug Resistant Organisms: None Reported


Past Surgical History: Tubal Ligation


Past Anesthesia/Blood Transfusion Reactions: No Reported Reaction


Additional Past Anesthesia/Blood Transfusion Reaction / Comment(s): 

clausterphobia. pt stated she has never has a blood transfusion


Past Psychological History: Bipolar, Depression


Smoking Status: Current every day smoker


Past Alcohol Use History: None Reported


Past Drug Use History: None Reported





- Past Family History


  ** Mother


Additional Family Medical History / Comment(s): bipolar depression





  ** Father


Family Medical History: Cancer, Myocardial Infarction (MI)





General Exam


Limitations: no limitations


General appearance: alert, in no apparent distress, other (This is a well-

developed, well-nourished adult female patient in no acute distress.  Vital 

signs upon presentation are temperature 98.2F.  Pulse 78, respiration 16, blood

 pressure 111/74, pulse ox 94% on room air.)


Eye exam: Present: normal appearance, PERRL, EOMI.  Absent: scleral icterus, 

conjunctival injection, periorbital swelling


ENT exam: Present: normal exam, normal oropharynx, mucous membranes moist


Neck exam: Present: normal inspection, full ROM, other (Nontender, no step-off, 

no deformity to firm midline palpation of the posterior cervical spine.  Full 

range of motion without pain or limitation.).  Absent: tenderness, meningismus, 

lymphadenopathy


Respiratory exam: Present: normal lung sounds bilaterally.  Absent: respiratory 

distress, wheezes, rales, rhonchi, stridor


Cardiovascular Exam: Present: regular rate, normal rhythm, normal heart sounds. 

 Absent: systolic murmur, diastolic murmur, rubs, gallop, clicks


GI/Abdominal exam: Present: soft, normal bowel sounds.  Absent: distended, 

tenderness, guarding, rebound, rigid


Extremities exam: Present: full ROM, tenderness (Left lateral knee.  Right 

proximal fifth metatarsal.), normal capillary refill, other (There is abrasion 

noted to the right elbow.  No soft tissue swelling or ecchymosis.  Skin to the 

upper 70s is pink, warm, dry.  Cap refills less than 3 seconds.  Radial pulses 

2+ and equal bilaterally.  Patient has tenderness over the left lateral knee.  

No surface trauma noted.  She does exhibit full range of motion and increased 

pain with valgus and varus maneuvers.  Skin to the left lower extremities pink, 

warm, dry.  Cap refills less than 3 seconds.  Pedal and posttibial pulses are 2+

 and equal bilaterally.  There is also soft tissue swelling and ecchymosis noted

 to the right dorsal foot over the fifth metatarsal.).  Absent: normal 

inspection, pedal edema, joint swelling, calf tenderness


Back exam: Present: normal inspection, other (Nontender, no step-off, no 

deformity to firm midline palpation of the thoracic and lumbar vertebrae. Full 

range of motion without pain or limitation.).  Absent: vertebral tenderness


Neurological exam: Present: alert, oriented X3, CN II-XII intact


Psychiatric exam: Present: normal affect, normal mood


Skin exam: Present: warm, dry, intact, normal color.  Absent: rash





Course


                                   Vital Signs











  01/06/20 01/06/20 01/06/20





  16:53 16:57 17:55


 


Temperature 98.2 F  


 


Pulse Rate 78  73


 


Respiratory 16 20 20





Rate   


 


Blood Pressure 111/74  135/80


 


O2 Sat by Pulse 94 L  96





Oximetry   














Medical Decision Making





- Medical Decision Making


54-year-old female patient presents to the emergency department today for 

evaluation of right elbow, left knee, and right foot pain after experiencing a 

fall down 4 steps.  Physical examination did reveal soft tissue swelling and 

ecchymosis over the dorsal aspect of the right foot.  There is also some 

tenderness over the left lateral knee.  X-rays were obtained and showed no acute

 fractures or dislocations.  We will apply Ace wrap to the left knee and right 

foot for possible sprain.  She is instructed to rest, ice, elevate the 

extremities.  She is instructed to follow up with her primary care physician for

 recheck in 1-2 days.  Return parameters were discussed in detail.  She 

verbalizes understanding and agrees with this plan.








- Radiology Data


Radiology results: report reviewed, image reviewed





Disposition


Clinical Impression: 


 Multiple contusions, Foot sprain, Left knee sprain





Disposition: HOME SELF-CARE


Condition: Good


Instructions (If sedation given, give patient instructions):  Contusion in 

Adults (ED), Fall Prevention (ED)


Additional Instructions: 


Rest, ice, elevate the injured extremities.  Use Ace wrap for comfort and 

support. If pain symptoms persist N7 to 10 days have repeat x-rays performed.  

Optic primary care physician for recheck in 1-2 days.  Return to the emergency 

department immediately for any new, worsening, or concerning symptoms.


Prescriptions: 


Ibuprofen [Motrin] 600 mg PO Q8HR PRN #30 tab


 PRN Reason: Pain


Is patient prescribed a controlled substance at d/c from ED?: No


Referrals: 


Brendan Nassar Jr, DO [Primary Care Provider] - 1-2 days


Time of Disposition: 18:08

## 2020-01-06 NOTE — XR
EXAMINATION TYPE: XR elbow complete RT

 

DATE OF EXAM: 1/6/2020

 

COMPARISON: NONE

 

HISTORY: Elbow pain

 

TECHNIQUE: 3 views

 

FINDINGS: I see no fracture nor dislocation. Joint spaces are normal. There is no sign of elbow joint
 effusion.

 

IMPRESSION: Negative right elbow exam.

## 2020-06-04 ENCOUNTER — HOSPITAL ENCOUNTER (OUTPATIENT)
Dept: HOSPITAL 47 - EC | Age: 55
Setting detail: OBSERVATION
LOS: 1 days | Discharge: LEFT BEFORE DISCHARGE | End: 2020-06-05
Attending: INTERNAL MEDICINE | Admitting: INTERNAL MEDICINE
Payer: COMMERCIAL

## 2020-06-04 DIAGNOSIS — F17.200: ICD-10-CM

## 2020-06-04 DIAGNOSIS — Z79.899: ICD-10-CM

## 2020-06-04 DIAGNOSIS — G40.89: Primary | ICD-10-CM

## 2020-06-04 DIAGNOSIS — Z82.49: ICD-10-CM

## 2020-06-04 DIAGNOSIS — Z80.9: ICD-10-CM

## 2020-06-04 DIAGNOSIS — R53.1: ICD-10-CM

## 2020-06-04 DIAGNOSIS — Z03.818: ICD-10-CM

## 2020-06-04 DIAGNOSIS — G47.00: ICD-10-CM

## 2020-06-04 DIAGNOSIS — M79.7: ICD-10-CM

## 2020-06-04 DIAGNOSIS — J44.9: ICD-10-CM

## 2020-06-04 DIAGNOSIS — Z81.8: ICD-10-CM

## 2020-06-04 DIAGNOSIS — Z53.29: ICD-10-CM

## 2020-06-04 DIAGNOSIS — Z86.69: ICD-10-CM

## 2020-06-04 DIAGNOSIS — Z98.51: ICD-10-CM

## 2020-06-04 DIAGNOSIS — F40.240: ICD-10-CM

## 2020-06-04 DIAGNOSIS — K21.9: ICD-10-CM

## 2020-06-04 LAB
ALBUMIN SERPL-MCNC: 4.6 G/DL (ref 3.5–5)
ALP SERPL-CCNC: 91 U/L (ref 38–126)
ALT SERPL-CCNC: 13 U/L (ref 4–34)
ANION GAP SERPL CALC-SCNC: 12 MMOL/L
APAP SPEC-MCNC: <10 UG/ML
APTT BLD: 23.6 SEC (ref 22–30)
AST SERPL-CCNC: 17 U/L (ref 14–36)
BASOPHILS # BLD AUTO: 0.1 K/UL (ref 0–0.2)
BASOPHILS NFR BLD AUTO: 1 %
BUN SERPL-SCNC: 12 MG/DL (ref 7–17)
CALCIUM SPEC-MCNC: 10.2 MG/DL (ref 8.4–10.2)
CHLORIDE SERPL-SCNC: 105 MMOL/L (ref 98–107)
CO2 SERPL-SCNC: 24 MMOL/L (ref 22–30)
EOSINOPHIL # BLD AUTO: 0.1 K/UL (ref 0–0.7)
EOSINOPHIL NFR BLD AUTO: 2 %
ERYTHROCYTE [DISTWIDTH] IN BLOOD BY AUTOMATED COUNT: 5.34 M/UL (ref 3.8–5.4)
ERYTHROCYTE [DISTWIDTH] IN BLOOD: 13.9 % (ref 11.5–15.5)
GLUCOSE SERPL-MCNC: 112 MG/DL (ref 74–99)
HCT VFR BLD AUTO: 49.4 % (ref 34–46)
HGB BLD-MCNC: 16.1 GM/DL (ref 11.4–16)
INR PPP: 0.9 (ref ?–1.2)
KETONES UR QL STRIP.AUTO: (no result)
LITHIUM SERPL-MCNC: <0.2 MMOL/L
LYMPHOCYTES # SPEC AUTO: 1.8 K/UL (ref 1–4.8)
LYMPHOCYTES NFR SPEC AUTO: 26 %
MAGNESIUM SPEC-SCNC: 1.9 MG/DL (ref 1.6–2.3)
MCH RBC QN AUTO: 30.3 PG (ref 25–35)
MCHC RBC AUTO-ENTMCNC: 32.7 G/DL (ref 31–37)
MCV RBC AUTO: 92.6 FL (ref 80–100)
MONOCYTES # BLD AUTO: 0.3 K/UL (ref 0–1)
MONOCYTES NFR BLD AUTO: 4 %
NEUTROPHILS # BLD AUTO: 4.5 K/UL (ref 1.3–7.7)
NEUTROPHILS NFR BLD AUTO: 65 %
PH UR: 7.5 [PH] (ref 5–8)
PLATELET # BLD AUTO: 370 K/UL (ref 150–450)
POTASSIUM SERPL-SCNC: 3.7 MMOL/L (ref 3.5–5.1)
PROT SERPL-MCNC: 7.6 G/DL (ref 6.3–8.2)
PT BLD: 9.5 SEC (ref 9–12)
SALICYLATES SERPL-MCNC: <1 MG/DL
SODIUM SERPL-SCNC: 141 MMOL/L (ref 137–145)
SP GR UR: >1.05 (ref 1–1.03)
UROBILINOGEN UR QL STRIP: <2 MG/DL (ref ?–2)
WBC # BLD AUTO: 6.9 K/UL (ref 3.8–10.6)

## 2020-06-04 PROCEDURE — 96372 THER/PROPH/DIAG INJ SC/IM: CPT

## 2020-06-04 PROCEDURE — 83520 IMMUNOASSAY QUANT NOS NONAB: CPT

## 2020-06-04 PROCEDURE — 70553 MRI BRAIN STEM W/O & W/DYE: CPT

## 2020-06-04 PROCEDURE — 82550 ASSAY OF CK (CPK): CPT

## 2020-06-04 PROCEDURE — 70460 CT HEAD/BRAIN W/DYE: CPT

## 2020-06-04 PROCEDURE — 36415 COLL VENOUS BLD VENIPUNCTURE: CPT

## 2020-06-04 PROCEDURE — 96376 TX/PRO/DX INJ SAME DRUG ADON: CPT

## 2020-06-04 PROCEDURE — 70450 CT HEAD/BRAIN W/O DYE: CPT

## 2020-06-04 PROCEDURE — 99285 EMERGENCY DEPT VISIT HI MDM: CPT

## 2020-06-04 PROCEDURE — 80178 ASSAY OF LITHIUM: CPT

## 2020-06-04 PROCEDURE — 80329 ANALGESICS NON-OPIOID 1 OR 2: CPT

## 2020-06-04 PROCEDURE — 80053 COMPREHEN METABOLIC PANEL: CPT

## 2020-06-04 PROCEDURE — 71046 X-RAY EXAM CHEST 2 VIEWS: CPT

## 2020-06-04 PROCEDURE — 80306 DRUG TEST PRSMV INSTRMNT: CPT

## 2020-06-04 PROCEDURE — 83735 ASSAY OF MAGNESIUM: CPT

## 2020-06-04 PROCEDURE — 93005 ELECTROCARDIOGRAM TRACING: CPT

## 2020-06-04 PROCEDURE — 81003 URINALYSIS AUTO W/O SCOPE: CPT

## 2020-06-04 PROCEDURE — 96374 THER/PROPH/DIAG INJ IV PUSH: CPT

## 2020-06-04 PROCEDURE — 85025 COMPLETE CBC W/AUTO DIFF WBC: CPT

## 2020-06-04 PROCEDURE — 96361 HYDRATE IV INFUSION ADD-ON: CPT

## 2020-06-04 PROCEDURE — 84484 ASSAY OF TROPONIN QUANT: CPT

## 2020-06-04 PROCEDURE — 80320 DRUG SCREEN QUANTALCOHOLS: CPT

## 2020-06-04 PROCEDURE — 96375 TX/PRO/DX INJ NEW DRUG ADDON: CPT

## 2020-06-04 PROCEDURE — 85610 PROTHROMBIN TIME: CPT

## 2020-06-04 PROCEDURE — 85730 THROMBOPLASTIN TIME PARTIAL: CPT

## 2020-06-04 PROCEDURE — 95816 EEG AWAKE AND DROWSY: CPT

## 2020-06-04 PROCEDURE — 84443 ASSAY THYROID STIM HORMONE: CPT

## 2020-06-04 NOTE — XR
EXAMINATION TYPE: XR chest 2V

 

DATE OF EXAM: 6/4/2020

 

COMPARISON: 10/19/2018

 

HISTORY: Chest pain

 

TECHNIQUE:  Frontal and lateral views of the chest are obtained.

 

FINDINGS:  There is no focal air space opacity, pleural effusion, or pneumothorax seen.  The cardiac 
silhouette size is upper limits of normal size.   The osseous structures are intact.

 

IMPRESSION:  No acute cardiopulmonary process.

## 2020-06-04 NOTE — P.HPIM
History of Present Illness


54-year-old female came in with dizziness in the generalized weakness for 2 days

and ended up having a seizure at the  and patient apparently had 

seizures for last 5 days.  Patient has a two-minute tonic-clonic activity family

is not available at this time.  This history was up and from the ER physician 

dictation.  Patient is unable to provide any good history to me patient is 

having generalized weakness throughout the day lately.  Patient apparently was 

having frequent seizures or lasts to 3 months.  Patient didn't have any bowel or

bladder incontinence tongue biting.  Patient denied any fever chills denied any 

headache denied any focal weakness but does have significant generalized 

weakness.  Patient denied any photophobia nausea vomiting.  No signs or symptoms

of sepsis.  Patient was born in here basically for not eating well lately and 

has been weak lately has for the .  Patient a CAT scan of the head which 

did not show any cerebral aneurysms no significant abnormality was appreciated. 

She had a seizure disorder as a kid but she grew out of it and never had any 

seizures after that for a long time








Review of Systems


Negative for for those mentioned above.








Past Medical History


Past Medical History: Asthma, Chest Pain / Angina, COPD, Fibromyalgia, 

GERD/Reflux


Additional Past Medical History / Comment(s): bipolar depression, "insomnia"


History of Any Multi-Drug Resistant Organisms: None Reported


Past Surgical History: Tubal Ligation


Past Anesthesia/Blood Transfusion Reactions: No Reported Reaction


Additional Past Anesthesia/Blood Transfusion Reaction / Comment(s): 

clausterphobia. pt stated she has never has a blood transfusion


Past Psychological History: Bipolar, Depression


Smoking Status: Current every day smoker


Past Alcohol Use History: None Reported


Past Drug Use History: None Reported





- Past Family History


  ** Mother


Additional Family Medical History / Comment(s): bipolar depression





  ** Father


Family Medical History: Cancer, Myocardial Infarction (MI)





Medications and Allergies


                                Home Medications











 Medication  Instructions  Recorded  Confirmed  Type


 


Amitriptyline HCl 25 mg PO HS 10/19/18 10/19/18 History


 


Ranitidine HCl 150 mg PO HS 10/19/18 10/19/18 History


 


Doxycycline Monohydrate [Monodox] 100 mg PO BID 3 Days #6 cap 10/21/18  Rx


 


predniSONE 10 mg PO DAILY #30 tab 10/21/18  Rx


 


Ibuprofen [Motrin] 600 mg PO Q8HR PRN #30 tab 01/06/20  Rx








                                    Allergies











Allergy/AdvReac Type Severity Reaction Status Date / Time


 


No Known Allergies Allergy   Verified 06/04/20 15:07














Physical Exam


Vitals: 


                                   Vital Signs











  Temp Pulse Resp BP Pulse Ox


 


 06/04/20 18:30  98.0 F  71  18  105/63  97


 


 06/04/20 18:00   76  18  118/78  97


 


 06/04/20 17:30   80  18  123/86  95


 


 06/04/20 16:30    18  122/82  95


 


 06/04/20 16:00    18  118/81  97


 


 06/04/20 15:30    18  125/88  95


 


 06/04/20 15:00    18  130/85  97


 


 06/04/20 14:30  98.8 F  79  18  138/120  99


 


 06/04/20 14:10     138/120  98








                                Intake and Output











 06/04/20 06/04/20 06/04/20





 06:59 14:59 22:59


 


Other:   


 


  Weight  94.347 kg 

















PHYSICAL EXAMINATION: 





GENERAL: The patient is alert and oriented x3, not in any acute distress. Well 

developed, well nourished. 


HEENT: Pupils are round and equally reacting to light. EOMI. No scleral icterus.

 No conjunctival pallor. Normocephalic, atraumatic. No pharyngeal erythema. No 

thyromegaly. 


CARDIOVASCULAR: S1 and S2 present. No murmurs, rubs, or gallops. 


PULMONARY: Chest is clear to auscultation, no wheezing or crackles. 


ABDOMEN: Soft, nontender, nondistended, normoactive bowel sounds. No palpable 

organomegaly. 


MUSCULOSKELETAL: No joint swelling or deformity.


EXTREMITIES: No cyanosis, clubbing, or pedal edema. 


NEUROLOGICAL: Significant generalized weakness, no focal weakness was 

appreciated


SKIN: No rashes. 

















Results


CBC & Chem 7: 


                                 06/04/20 14:30





                                 06/04/20 14:30


Labs: 


                  Abnormal Lab Results - Last 24 Hours (Table)











  06/04/20 06/04/20 Range/Units





  14:30 14:30 


 


Hgb  16.1 H   (11.4-16.0)  gm/dL


 


Hct  49.4 H   (34.0-46.0)  %


 


Glucose   112 H  (74-99)  mg/dL














Assessment and Plan


Plan: 


-New-onset seizures: Patient will need to be evaluated for second because this 

will obtain an MRI with contrast, EEG neurology consultation.  Seizure 

precautions as needed Ativan for seizures


-Generalized weakness etiology is not clear may be subclinical seizures .  He is

 which will be obtained


-Asthma without any acute exacerbation


-Fibromyalgia


Gastroesophageal reflux disease 


-nicotine abuse: Counseling was provided


Due to prophylaxis with the Lovenox and the GI prophylaxis with Protonix

## 2020-06-04 NOTE — CT
EXAMINATION TYPE: CT brain wo con

 

DATE OF EXAM: 6/4/2020

 

COMPARISON: None

 

HISTORY: Seizure activity.

 

CT DLP: 1169.4 mGycm

Automated exposure control for dose reduction was used.

 

FINDINGS: 

There is no midline shift or mass effect. Calvarium is intact. No mass effect. No midline shift. No a
cute intracranial hemorrhage. Ventricular system is compatible with the patient's age. There is sligh
t asymmetry the dural venous sinuses. Postcontrast CT is recommended.

 

IMPRESSION:

1. No definite acute hemorrhage or mass effect. However, the dural venous sinuses appear somewhat den
se. This most likely related to asymmetric flow. Post contrast CT of the brain is recommended for fur
ther assessment.

## 2020-06-04 NOTE — ED
General Adult HPI





- General


Chief complaint: Seizure


Stated complaint: Seizure


Time Seen by Provider: 06/04/20 14:15


Source: RN notes reviewed, old records reviewed





- History of Present Illness


Initial comments: 


Patient is a 54-year-old female who presents emergency Department today with 

initial chief complaint of dizziness and feeling weak for the past 2 days.  

However his Patient was being wheeled into the emergency department she started 

to have a seizure at the .  She was immediately wheeled back to the 

exam room and proceeded to have a 2 minute tonic-clonic seizure.  She did not 

fall out of the wheelchair or  hit her head.  When Patient  came out of the 

seizure she is postictal.   reports that she has been having frequent 

seizures over the past 3 months and has had a total of 5.  She's not had further

evaluation for this.  Her  believes that her seizures are related to 

emotional stressors at home.  They do state that initially was bringing the 

Patient into the emergency department was significant dizziness and weakness 

after doing a 2 mile bike ride 2 days ago.  She's been laying on the couch since

then, not eating or drinking much and has been was concerned she is dehydrated. 

 does relate that she's had a history of seizures when she was younger bu

t then she "grew out of them".  She had not had a significant seizure history up

until the past 3 months again.  She denies drug or alcohol use. 








- Related Data


                                Home Medications











 Medication  Instructions  Recorded  Confirmed


 


Amitriptyline HCl 25 mg PO HS 10/19/18 10/19/18


 


Ranitidine HCl 150 mg PO HS 10/19/18 10/19/18








                                  Previous Rx's











 Medication  Instructions  Recorded


 


Doxycycline Monohydrate [Monodox] 100 mg PO BID 3 Days #6 cap 10/21/18


 


predniSONE 10 mg PO DAILY #30 tab 10/21/18


 


Ibuprofen [Motrin] 600 mg PO Q8HR PRN #30 tab 01/06/20











                                    Allergies











Allergy/AdvReac Type Severity Reaction Status Date / Time


 


No Known Allergies Allergy   Verified 06/04/20 15:07














Review of Systems


ROS Statement: 


Those systems with pertinent positive or pertinent negative responses have been 

documented in the HPI.





ROS Other: All systems not noted in ROS Statement are negative.





Past Medical History


Past Medical History: Asthma, Chest Pain / Angina, COPD, Fibromyalgia, 

GERD/Reflux


Additional Past Medical History / Comment(s): bipolar depression, "insomnia"


History of Any Multi-Drug Resistant Organisms: None Reported


Past Surgical History: Tubal Ligation


Past Anesthesia/Blood Transfusion Reactions: No Reported Reaction


Additional Past Anesthesia/Blood Transfusion Reaction / Comment(s): 

clausterphobia. pt stated she has never has a blood transfusion


Past Psychological History: Bipolar, Depression


Smoking Status: Current every day smoker


Past Alcohol Use History: None Reported


Past Drug Use History: None Reported





- Past Family History


  ** Mother


Additional Family Medical History / Comment(s): bipolar depression





  ** Father


Family Medical History: Cancer, Myocardial Infarction (MI)





General Exam





- General Exam Comments


Initial Comments: 





54-year-old female.  Alert and oriented


General appearance: alert, in no apparent distress


Head exam: Present: atraumatic, normocephalic, normal inspection


Eye exam: Present: normal appearance, PERRL, EOMI.  Absent: scleral icterus, 

conjunctival injection, periorbital swelling


ENT exam: Present: normal exam, mucous membranes moist


Neck exam: Present: normal inspection.  Absent: tenderness, meningismus, 

lymphadenopathy


Respiratory exam: Present: normal lung sounds bilaterally.  Absent: respiratory 

distress, wheezes, rales, rhonchi, stridor


Cardiovascular Exam: Present: regular rate, normal rhythm, normal heart sounds. 

 Absent: systolic murmur, diastolic murmur, rubs, gallop, clicks


GI/Abdominal exam: Present: soft, normal bowel sounds.  Absent: distended, 

tenderness, guarding, rebound, rigid


Extremities exam: Present: normal inspection, full ROM, normal capillary refill.

  Absent: tenderness, pedal edema, joint swelling, calf tenderness


Back exam: Present: normal inspection


Neurological exam: Present: alert


Psychiatric exam: Present: normal affect, normal mood


Skin exam: Present: warm, dry, intact, normal color.  Absent: rash





Course


                                   Vital Signs











  06/04/20 06/04/20 06/04/20





  14:10 14:30 15:00


 


Temperature  98.8 F 


 


Pulse Rate  79 


 


Respiratory  18 18





Rate   


 


Blood Pressure 138/120 138/120 130/85


 


O2 Sat by Pulse 98 99 97





Oximetry   














  06/04/20 06/04/20 06/04/20





  15:30 16:00 16:30


 


Temperature   


 


Pulse Rate   


 


Respiratory 18 18 18





Rate   


 


Blood Pressure 125/88 118/81 122/82


 


O2 Sat by Pulse 95 97 95





Oximetry   














  06/04/20





  17:30


 


Temperature 


 


Pulse Rate 80


 


Respiratory 18





Rate 


 


Blood Pressure 123/86


 


O2 Sat by Pulse 95





Oximetry 














Medical Decision Making





- Medical Decision Making





54-year-old female presents emergency department today with complaints of a 

seizure as well as generalized weakness and not feeling well for the past 2 

days.  Patient has had 5 seizures in the past 3 months and no workup.  She does 

have a previous history of seizures when she was younger.  She does not see a 

neurologist.  Patient had a 2 minute tonoclonic seizure upon arrival.  She was 

initially postictal but is now becoming more arousable.  Blood work was reviewed

 and unremarkable.  Patient's CT of the brain was reviewed and did show some 

abnormal concern for on asymmetric flow with the venous sinuses.  However 

computed tomography scan with contrast was completed with an unremarkable study.

  Patient's case was discussed with Dr. Turner who discussed the case with Dr. Jones has not.  We'll consult neurology to these frequent seizures.  Patient is 

agreeable to treatment plan.





- Lab Data


Result diagrams: 


                                 06/04/20 14:30





                                 06/04/20 14:30


                                   Lab Results











  06/04/20 06/04/20 06/04/20 Range/Units





  14:30 14:30 14:30 


 


WBC  6.9    (3.8-10.6)  k/uL


 


RBC  5.34    (3.80-5.40)  m/uL


 


Hgb  16.1 H    (11.4-16.0)  gm/dL


 


Hct  49.4 H    (34.0-46.0)  %


 


MCV  92.6    (80.0-100.0)  fL


 


MCH  30.3    (25.0-35.0)  pg


 


MCHC  32.7    (31.0-37.0)  g/dL


 


RDW  13.9    (11.5-15.5)  %


 


Plt Count  370    (150-450)  k/uL


 


Neutrophils %  65    %


 


Lymphocytes %  26    %


 


Monocytes %  4    %


 


Eosinophils %  2    %


 


Basophils %  1    %


 


Neutrophils #  4.5    (1.3-7.7)  k/uL


 


Lymphocytes #  1.8    (1.0-4.8)  k/uL


 


Monocytes #  0.3    (0-1.0)  k/uL


 


Eosinophils #  0.1    (0-0.7)  k/uL


 


Basophils #  0.1    (0-0.2)  k/uL


 


PT    9.5  (9.0-12.0)  sec


 


INR    0.9  (<1.2)  


 


APTT    23.6  (22.0-30.0)  sec


 


Sodium   141   (137-145)  mmol/L


 


Potassium   3.7   (3.5-5.1)  mmol/L


 


Chloride   105   ()  mmol/L


 


Carbon Dioxide   24   (22-30)  mmol/L


 


Anion Gap   12   mmol/L


 


BUN   12   (7-17)  mg/dL


 


Creatinine   0.72   (0.52-1.04)  mg/dL


 


Est GFR (CKD-EPI)AfAm   >90   (>60 ml/min/1.73 sqM)  


 


Est GFR (CKD-EPI)NonAf   >90   (>60 ml/min/1.73 sqM)  


 


Glucose   112 H   (74-99)  mg/dL


 


Calcium   10.2   (8.4-10.2)  mg/dL


 


Magnesium   1.9   (1.6-2.3)  mg/dL


 


Total Bilirubin   0.4   (0.2-1.3)  mg/dL


 


AST   17   (14-36)  U/L


 


ALT   13   (4-34)  U/L


 


Alkaline Phosphatase   91   ()  U/L


 


Troponin I     (0.000-0.034)  ng/mL


 


Total Protein   7.6   (6.3-8.2)  g/dL


 


Albumin   4.6   (3.5-5.0)  g/dL


 


Salicylates   <1.0   mg/dL


 


Acetaminophen   <10.0   ug/mL


 


Lithium   <0.2   mmol/L


 


Serum Alcohol   <10   mg/dL














  06/04/20 Range/Units





  14:30 


 


WBC   (3.8-10.6)  k/uL


 


RBC   (3.80-5.40)  m/uL


 


Hgb   (11.4-16.0)  gm/dL


 


Hct   (34.0-46.0)  %


 


MCV   (80.0-100.0)  fL


 


MCH   (25.0-35.0)  pg


 


MCHC   (31.0-37.0)  g/dL


 


RDW   (11.5-15.5)  %


 


Plt Count   (150-450)  k/uL


 


Neutrophils %   %


 


Lymphocytes %   %


 


Monocytes %   %


 


Eosinophils %   %


 


Basophils %   %


 


Neutrophils #   (1.3-7.7)  k/uL


 


Lymphocytes #   (1.0-4.8)  k/uL


 


Monocytes #   (0-1.0)  k/uL


 


Eosinophils #   (0-0.7)  k/uL


 


Basophils #   (0-0.2)  k/uL


 


PT   (9.0-12.0)  sec


 


INR   (<1.2)  


 


APTT   (22.0-30.0)  sec


 


Sodium   (137-145)  mmol/L


 


Potassium   (3.5-5.1)  mmol/L


 


Chloride   ()  mmol/L


 


Carbon Dioxide   (22-30)  mmol/L


 


Anion Gap   mmol/L


 


BUN   (7-17)  mg/dL


 


Creatinine   (0.52-1.04)  mg/dL


 


Est GFR (CKD-EPI)AfAm   (>60 ml/min/1.73 sqM)  


 


Est GFR (CKD-EPI)NonAf   (>60 ml/min/1.73 sqM)  


 


Glucose   (74-99)  mg/dL


 


Calcium   (8.4-10.2)  mg/dL


 


Magnesium   (1.6-2.3)  mg/dL


 


Total Bilirubin   (0.2-1.3)  mg/dL


 


AST   (14-36)  U/L


 


ALT   (4-34)  U/L


 


Alkaline Phosphatase   ()  U/L


 


Troponin I  <0.012  (0.000-0.034)  ng/mL


 


Total Protein   (6.3-8.2)  g/dL


 


Albumin   (3.5-5.0)  g/dL


 


Salicylates   mg/dL


 


Acetaminophen   ug/mL


 


Lithium   mmol/L


 


Serum Alcohol   mg/dL














- Radiology Data


Radiology results: report reviewed


EKG performed at 15 454 shows normal sinus rhythm and normal EKG.  Ventricular 

rate of 74 bpm.  Parents was 162 ms.  QRS duration is 84 ms.  QT QTc is 400/444 

ms.





CT of the brain with contrast was reviewed and unremarkable study.  CT of the 

brain without shows no definite acute hemorrhage or mass effect however internal

 venous sinuses appear somewhat tense.  Likely related to asymmetric flow.  

Postcontrast CT of the brain is recommended for further assessment.





 CXR shows no acute Cardiopulmonary process.





Disposition


Clinical Impression: 


 Seizure





Disposition: ADMITTED AS IP TO THIS HOSP


Condition: Stable


Is patient prescribed a controlled substance at d/c from ED?: No


Referrals: 


Carey Weir MD [Primary Care Provider] - 1-2 days


Time of Disposition: 17:49

## 2020-06-04 NOTE — CT
EXAMINATION TYPE: CT brain w con

 

DATE OF EXAM: 6/4/2020

 

COMPARISON: CT brain earlier today.

 

HISTORY: Abnormal CT.

 

CT DLP: 1143.4 mGycm.  Automated Exposure Control for Dose Reduction was Utilized.

 

TECHNIQUE:  CT scan of the head is performed  with IV Contrast, patient injected with 100 mL of Isovu
e 300.

 

FINDINGS: Postcontrast images show no suspicious enhancing mass. Draining dural venous sinuses appear
 patent. The ventricles and sulci are within normal limits in size. Gray-white matter differentiation
 is preserved. The globes are intact and the visualized sinuses are clear.

 

IMPRESSION: Unremarkable study.

## 2020-06-05 VITALS
TEMPERATURE: 97.8 F | RESPIRATION RATE: 15 BRPM | DIASTOLIC BLOOD PRESSURE: 67 MMHG | HEART RATE: 73 BPM | SYSTOLIC BLOOD PRESSURE: 106 MMHG

## 2020-06-05 RX ADMIN — KETOROLAC TROMETHAMINE PRN MG: 30 INJECTION, SOLUTION INTRAMUSCULAR at 12:03

## 2020-06-05 RX ADMIN — CEFAZOLIN SCH: 330 INJECTION, POWDER, FOR SOLUTION INTRAMUSCULAR; INTRAVENOUS at 16:07

## 2020-06-05 RX ADMIN — CEFAZOLIN SCH MLS/HR: 330 INJECTION, POWDER, FOR SOLUTION INTRAMUSCULAR; INTRAVENOUS at 04:14

## 2020-06-05 RX ADMIN — KETOROLAC TROMETHAMINE PRN MG: 30 INJECTION, SOLUTION INTRAMUSCULAR at 04:27

## 2020-06-05 RX ADMIN — CEFAZOLIN SCH MLS/HR: 330 INJECTION, POWDER, FOR SOLUTION INTRAMUSCULAR; INTRAVENOUS at 01:26

## 2020-06-05 NOTE — EEG
ELECTROENCEPHALOGRAM REPORT



DATE OF SERVICE:

06/05/2020



PREAMBLE:

This is a 54-year-old female who came to the hospital because of dizziness and

generalized weakness of 2 days.  She had a witnessed seizure in the ER, 2-minute tonic-

clonic activity.  The patient has been having frequent seizures at home for the past 3

months.  This study is performed to evaluate for any epileptiform activity.



EEG FINDINGS:

This is a 21-channel routine EEG recording in a patient utilizing 10-20 international

system with referential and bipolar montages. The background consists of well

developed, well regulated, moderate voltage activity in 9-10 hertz alpha.  Background

is posterior-dominant and reactive to eye opening and closing.  Background was reactive

to some flash frequencies on photic stimulation.  Drowsiness and different stages of

sleep were not seen.  No focal or generalized epileptiform activity was seen.  EKG

rhythm lead revealed no arrhythmia.



IMPRESSION:

This is a normal awake EEG.  No focal, lateralized or epileptiform activity was seen.





MMODL / IJN: [id number] / Job#: [id number]

## 2020-06-05 NOTE — MR
EXAMINATION TYPE: MR brain wo/w con

 

DATE OF EXAM: 6/5/2020

 

COMPARISON: CT brain studies from yesterday.

 

HISTORY: Seizure

 

TECHNIQUE: 

Multiplanar, multisequence images of the brain and brainstem is performed without and with IV contras
t, utilizing 9.5 mL intravenous Gadavist .

 

FINDINGS: Exam noted suboptimal as patient unable to hold still, technologist needed to perform fast 
brain protocol.

 

Diffusion weighted images demonstrate no evidence of a recent infarct or other diffusion abnormality.
  There is no worrisome extra-axial fluid collection.  The ventricular system and cisternal spaces ar
e normal in size and appearance.  The brain volume is age appropriate. Focal and confluent areas of T
2 hyperintensity scattered throughout the white matter bilaterally are present. Tiny cavum septum pel
lucidum. T2 coronal weighted images show hippocampal gyri appear symmetric and felt within normal dumont
its. No suspicious edema or enhancement at this level noted.

 

Midline structures demonstrate normal morphology.  The craniocervical junction appears within normal 
limits.  Post contrast images demonstrate no abnormal enhancement. The dural venous sinuses appear pa
tent. The visualized sinuses are clear and the globes are intact.

 

IMPRESSION: Suboptimal study with mild-to-moderate nonspecific white matter changes felt present. No 
suspicious focal mass or enhancement noted.

## 2020-06-05 NOTE — P.DS
Providers


Date of admission: 


06/04/20 18:18





Expected date of discharge: 06/05/20


Attending physician: 


Rajesh Alicea





Consults: 





                                        





06/04/20 17:50


Consult Physician Stat 


   Consulting Provider: Marciano West Reason/Comments: seizure


   Do you want consulting provider notified?: Yes











Primary care physician: 


[facility] Course: 





Final diagnosis








-New-onset seizures


-Generalized weakness etiology is not clear


-Asthma without any acute exacerbation


-Fibromyalgia


-Gastroesophageal reflux disease 


-nicotine abuse: Counseling was provided


-DVT prophylaxis 


-GI prophylaxis 





Discharge disposition


Patient is leaving AGAINST MEDICAL ADVICE.  Patient will need to follow-up with 

her primary care provider and neurology in the outpatient setting.





History of present illness


54-year-old female came in with dizziness in the generalized weakness for 2 days

and ended up having a seizure at the  and patient apparently had 

seizures for last 5 days.  Patient has a two-minute tonic-clonic activity family

is not available at this time.  This history was up and from the ER physician 

dictation.  Patient is unable to provide any good history to me patient is 

having generalized weakness throughout the day lately.  Patient apparently was 

having frequent seizures or lasts to 3 months.  Patient didn't have any bowel or

bladder incontinence tongue biting.  Patient denied any fever chills denied any 

headache denied any focal weakness but does have significant generalized 

weakness.  Patient denied any photophobia nausea vomiting.  No signs or symptoms

of sepsis.  Patient was born in here basically for not eating well lately and 

has been weak lately has for the .  Patient a CAT scan of the head which 

did not show any cerebral aneurysms no significant abnormality was appreciated. 

She had a seizure disorder as a kid but she grew out of it and never had any 

seizures after that for a long time





06/05/2020


Patient underwent an MRI of the brain and EEG which were both negative.  Patient

continues to be weak although is alert and oriented 3 and is walking to the 

bathroom with standby assist.  Patient demanding to leave AGAINST MEDICAL ADVICE

as she does not stay here another night.  Neurology was consulted although 

patient was not evaluated by neurology at this time.  Discussed with the patient

about following up with primary care provider and neurology in the outpatient 

setting.





On exam vital signs are stable.  Temp is 97.8F, pulse is 73, respirations are 

15, blood pressure is 106/67, oxygen saturation is 93% on room air.  Cardio S1, 

S2 are present.  Respiratory shows clear to auscultation.  Abdomen is soft and 

nontender.  Nervous system shows no focal deficits.











Patient Condition at Discharge: Stable





Plan - Discharge Summary


New Discharge Prescriptions: 


No Action


   Ranitidine HCl 150 mg PO HS


   Beclomethasone Dip 80 Mcg/Puff [Qvar 80 mcg] 1 puff INHALATION RT-BID


   PARoxetine HCL [Paxil] 30 mg PO DAILY


   Ergocalciferol [Vitamin D2] 50,000 unit PO Q7D


   Amitriptyline HCl [Elavil] 100 mg PO HS


   Triamcinolone 0.025% Cream [Kenalog 0.025% Cream] 1 applic TOPICAL BID


   Simvastatin [Zocor] 5 mg PO HS


   Cyanocobalamin (Vitamin B-12) [Vitamin B-12] 2,500 mcg PO DAILY


Discharge Medication List





Ranitidine HCl 150 mg PO HS 10/19/18 [History]


Amitriptyline HCl [Elavil] 100 mg PO HS 06/05/20 [History]


Beclomethasone Dip 80 Mcg/Puff [Qvar 80 mcg] 1 puff INHALATION RT-BID 06/05/20 

[History]


Cyanocobalamin (Vitamin B-12) [Vitamin B-12] 2,500 mcg PO DAILY 06/05/20 

[History]


Ergocalciferol [Vitamin D2] 50,000 unit PO Q7D 06/05/20 [History]


PARoxetine HCL [Paxil] 30 mg PO DAILY 06/05/20 [History]


Simvastatin [Zocor] 5 mg PO HS 06/05/20 [History]


Triamcinolone 0.025% Cream [Kenalog 0.025% Cream] 1 applic TOPICAL BID 06/05/20 

[History]








Follow up Appointment(s)/Referral(s): 


Carey Weir MD [Primary Care Provider] - 1-2 days

## 2022-09-14 ENCOUNTER — HOSPITAL ENCOUNTER (EMERGENCY)
Dept: HOSPITAL 47 - EC | Age: 57
LOS: 1 days | Discharge: HOME | End: 2022-09-15
Payer: COMMERCIAL

## 2022-09-14 VITALS
DIASTOLIC BLOOD PRESSURE: 84 MMHG | HEART RATE: 84 BPM | SYSTOLIC BLOOD PRESSURE: 116 MMHG | TEMPERATURE: 97.8 F | RESPIRATION RATE: 16 BRPM

## 2022-09-14 DIAGNOSIS — J44.9: ICD-10-CM

## 2022-09-14 DIAGNOSIS — R42: Primary | ICD-10-CM

## 2022-09-14 DIAGNOSIS — F17.200: ICD-10-CM

## 2022-09-14 DIAGNOSIS — Z79.51: ICD-10-CM

## 2022-09-14 DIAGNOSIS — K21.9: ICD-10-CM

## 2022-09-14 DIAGNOSIS — J45.909: ICD-10-CM

## 2022-09-14 LAB
ALBUMIN SERPL-MCNC: 4.2 G/DL (ref 3.5–5)
ALP SERPL-CCNC: 134 U/L (ref 38–126)
ALT SERPL-CCNC: 18 U/L (ref 4–34)
ANION GAP SERPL CALC-SCNC: 11 MMOL/L
APTT BLD: 23 SEC (ref 22–30)
AST SERPL-CCNC: 20 U/L (ref 14–36)
BASOPHILS # BLD AUTO: 0.1 K/UL (ref 0–0.2)
BASOPHILS NFR BLD AUTO: 1 %
BUN SERPL-SCNC: 8 MG/DL (ref 7–17)
CALCIUM SPEC-MCNC: 8.9 MG/DL (ref 8.4–10.2)
CHLORIDE SERPL-SCNC: 101 MMOL/L (ref 98–107)
CO2 SERPL-SCNC: 27 MMOL/L (ref 22–30)
EOSINOPHIL # BLD AUTO: 0.2 K/UL (ref 0–0.7)
EOSINOPHIL NFR BLD AUTO: 3 %
ERYTHROCYTE [DISTWIDTH] IN BLOOD BY AUTOMATED COUNT: 4.68 M/UL (ref 3.8–5.4)
ERYTHROCYTE [DISTWIDTH] IN BLOOD: 16.6 % (ref 11.5–15.5)
GLUCOSE SERPL-MCNC: 110 MG/DL (ref 74–99)
HCT VFR BLD AUTO: 40.7 % (ref 34–46)
HGB BLD-MCNC: 12.4 GM/DL (ref 11.4–16)
INR PPP: 0.8 (ref ?–1.2)
LYMPHOCYTES # SPEC AUTO: 2.1 K/UL (ref 1–4.8)
LYMPHOCYTES NFR SPEC AUTO: 24 %
MCH RBC QN AUTO: 26.5 PG (ref 25–35)
MCHC RBC AUTO-ENTMCNC: 30.4 G/DL (ref 31–37)
MCV RBC AUTO: 87.1 FL (ref 80–100)
MONOCYTES # BLD AUTO: 0.4 K/UL (ref 0–1)
MONOCYTES NFR BLD AUTO: 5 %
NEUTROPHILS # BLD AUTO: 5.6 K/UL (ref 1.3–7.7)
NEUTROPHILS NFR BLD AUTO: 66 %
PLATELET # BLD AUTO: 468 K/UL (ref 150–450)
POTASSIUM SERPL-SCNC: 4.4 MMOL/L (ref 3.5–5.1)
PROT SERPL-MCNC: 7.3 G/DL (ref 6.3–8.2)
PT BLD: 9.4 SEC (ref 9–12)
SODIUM SERPL-SCNC: 139 MMOL/L (ref 137–145)
WBC # BLD AUTO: 8.5 K/UL (ref 3.8–10.6)

## 2022-09-14 PROCEDURE — 93005 ELECTROCARDIOGRAM TRACING: CPT

## 2022-09-14 PROCEDURE — 85025 COMPLETE CBC W/AUTO DIFF WBC: CPT

## 2022-09-14 PROCEDURE — 80053 COMPREHEN METABOLIC PANEL: CPT

## 2022-09-14 PROCEDURE — 36415 COLL VENOUS BLD VENIPUNCTURE: CPT

## 2022-09-14 PROCEDURE — 85730 THROMBOPLASTIN TIME PARTIAL: CPT

## 2022-09-14 PROCEDURE — 84484 ASSAY OF TROPONIN QUANT: CPT

## 2022-09-14 PROCEDURE — 99284 EMERGENCY DEPT VISIT MOD MDM: CPT

## 2022-09-14 PROCEDURE — 85610 PROTHROMBIN TIME: CPT

## 2022-09-15 NOTE — ED
General Adult HPI





- General


Chief complaint: Dizziness


Stated complaint: Vomiting,Coughing, Dizzy


Time Seen by Provider: 09/15/22 00:05


Source: patient, RN notes reviewed, old records reviewed


Mode of arrival: ambulatory


Limitations: no limitations





- History of Present Illness


Initial comments: 





57 -year-old female presenting for evaluation of dizziness which is been present

for the past 2 months.  She has not contacted her primary care physician.  She 

states that this is worse with standing.  She denies headache.  Denies chest 

pain or abdominal pain.  Denies focal numbness or weakness.  She states she has 

had a poor appetite but despite this has continued to gain weight.  No recent 

weight loss.  No fevers.





- Related Data


                                Home Medications











 Medication  Instructions  Recorded  Confirmed


 


raNITIdine HCL [Ranitidine HCl] 150 mg PO HS 10/19/18 06/05/20


 


Amitriptyline HCl [Elavil] 100 mg PO HS 06/05/20 06/05/20


 


Beclomethasone Dip 80 Mcg/Puff 1 puff INHALATION RT-BID 06/05/20 06/05/20





[Qvar 80 mcg]   


 


Cyanocobalamin (Vitamin B-12) 2,500 mcg PO DAILY 06/05/20 06/05/20





[Vitamin B-12]   


 


Ergocalciferol [Vitamin D2] 50,000 unit PO Q7D 06/05/20 06/05/20


 


PARoxetine HCL [Paxil] 30 mg PO DAILY 06/05/20 06/05/20


 


Simvastatin [Zocor] 5 mg PO HS 06/05/20 06/05/20


 


Triamcinolone 0.025% Cream 1 applic TOPICAL BID 06/05/20 06/05/20





[Kenalog 0.025% Cream]   








                                  Previous Rx's











 Medication  Instructions  Recorded


 


Meclizine [Antivert] 12.5 mg PO Q8HR PRN #30 tablet 09/15/22











                                    Allergies











Allergy/AdvReac Type Severity Reaction Status Date / Time


 


No Known Allergies Allergy   Verified 09/14/22 22:34














Review of Systems


ROS Statement: 


Those systems with pertinent positive or pertinent negative responses have been 

documented in the HPI.





ROS Other: All systems not noted in ROS Statement are negative.





Past Medical History


Past Medical History: Asthma, Chest Pain / Angina, COPD, Fibromyalgia, 

GERD/Reflux


Additional Past Medical History / Comment(s): bipolar depression, "insomnia"


History of Any Multi-Drug Resistant Organisms: None Reported


Past Surgical History: Tubal Ligation


Past Anesthesia/Blood Transfusion Reactions: No Reported Reaction


Additional Past Anesthesia/Blood Transfusion Reaction / Comment(s): odin

sterphobia. pt stated she has never has a blood transfusion


Past Psychological History: Bipolar, Depression


Smoking Status: Current every day smoker


Past Alcohol Use History: None Reported


Past Drug Use History: None Reported





- Past Family History


  ** Mother


Additional Family Medical History / Comment(s): bipolar depression





  ** Father


Family Medical History: Cancer, Myocardial Infarction (MI)





General Exam


Limitations: no limitations


General appearance: alert, in no apparent distress


Head exam: Present: atraumatic, normocephalic


Eye exam: Present: normal appearance, PERRL


ENT exam: Present: mucous membranes dry


Neck exam: Present: normal inspection.  Absent: tenderness, meningismus


Respiratory exam: Present: normal lung sounds bilaterally.  Absent: respiratory 

distress, wheezes


Cardiovascular Exam: Present: regular rate, normal rhythm


GI/Abdominal exam: Present: soft.  Absent: distended, tenderness


Extremities exam: Present: normal inspection, normal capillary refill.  Absent: 

pedal edema


Neurological exam: Present: alert, oriented X3, CN II-XII intact, other (no limb

 ataxia, no nystagmus).  Absent: motor sensory deficit


Psychiatric exam: Present: normal affect, normal mood


Skin exam: Present: warm, dry, intact.  Absent: cyanosis, diaphoretic





Course


                                   Vital Signs











  09/14/22





  22:34


 


Temperature 97.8 F


 


Pulse Rate 84


 


Respiratory 16





Rate 


 


Blood Pressure 116/84


 


O2 Sat by Pulse 98





Oximetry 














EKG Findings





- EKG Comments:


EKG Findings:: EKG: Sinus rhythm, low voltage no ST segment changes rate of 80, 

WA interval 157, QRS duration 82 





Medical Decision Making





- Medical Decision Making





57-year-old female with 2 months of dizziness, no alarming features, stable 

vitals, EKG is sinus rhythm.  Normal CBC, CMP, negative troponin.  Patient has 

no focal numbness or weakness, no ataxia, no headache, no chest pain.  She will 

attempt trial of meclizine and increased oral hydration.  She will follow-up 

with her primary care physician and return with worsening or changing features.





- Lab Data


Result diagrams: 


                                 09/14/22 22:51





                                 09/14/22 22:51


                                   Lab Results











  09/14/22 09/14/22 09/14/22 Range/Units





  22:51 22:51 22:51 


 


WBC  8.5    (3.8-10.6)  k/uL


 


RBC  4.68    (3.80-5.40)  m/uL


 


Hgb  12.4    (11.4-16.0)  gm/dL


 


Hct  40.7    (34.0-46.0)  %


 


MCV  87.1    (80.0-100.0)  fL


 


MCH  26.5    (25.0-35.0)  pg


 


MCHC  30.4 L    (31.0-37.0)  g/dL


 


RDW  16.6 H    (11.5-15.5)  %


 


Plt Count  468 H    (150-450)  k/uL


 


MPV  6.6    


 


Neutrophils %  66    %


 


Lymphocytes %  24    %


 


Monocytes %  5    %


 


Eosinophils %  3    %


 


Basophils %  1    %


 


Neutrophils #  5.6    (1.3-7.7)  k/uL


 


Lymphocytes #  2.1    (1.0-4.8)  k/uL


 


Monocytes #  0.4    (0-1.0)  k/uL


 


Eosinophils #  0.2    (0-0.7)  k/uL


 


Basophils #  0.1    (0-0.2)  k/uL


 


Hypochromasia  Moderate    


 


Anisocytosis  Slight    


 


PT   9.4   (9.0-12.0)  sec


 


INR   0.8   (<1.2)  


 


APTT   23.0   (22.0-30.0)  sec


 


Sodium    139  (137-145)  mmol/L


 


Potassium    4.4  (3.5-5.1)  mmol/L


 


Chloride    101  ()  mmol/L


 


Carbon Dioxide    27  (22-30)  mmol/L


 


Anion Gap    11  mmol/L


 


BUN    8  (7-17)  mg/dL


 


Creatinine    0.82  (0.52-1.04)  mg/dL


 


Est GFR (CKD-EPI)AfAm    >90  (>60 ml/min/1.73 sqM)  


 


Est GFR (CKD-EPI)NonAf    80  (>60 ml/min/1.73 sqM)  


 


Glucose    110 H  (74-99)  mg/dL


 


Calcium    8.9  (8.4-10.2)  mg/dL


 


Total Bilirubin    0.4  (0.2-1.3)  mg/dL


 


AST    20  (14-36)  U/L


 


ALT    18  (4-34)  U/L


 


Alkaline Phosphatase    134 H  ()  U/L


 


Troponin I     (0.000-0.034)  ng/mL


 


Total Protein    7.3  (6.3-8.2)  g/dL


 


Albumin    4.2  (3.5-5.0)  g/dL














  09/14/22 Range/Units





  22:51 


 


WBC   (3.8-10.6)  k/uL


 


RBC   (3.80-5.40)  m/uL


 


Hgb   (11.4-16.0)  gm/dL


 


Hct   (34.0-46.0)  %


 


MCV   (80.0-100.0)  fL


 


MCH   (25.0-35.0)  pg


 


MCHC   (31.0-37.0)  g/dL


 


RDW   (11.5-15.5)  %


 


Plt Count   (150-450)  k/uL


 


MPV   


 


Neutrophils %   %


 


Lymphocytes %   %


 


Monocytes %   %


 


Eosinophils %   %


 


Basophils %   %


 


Neutrophils #   (1.3-7.7)  k/uL


 


Lymphocytes #   (1.0-4.8)  k/uL


 


Monocytes #   (0-1.0)  k/uL


 


Eosinophils #   (0-0.7)  k/uL


 


Basophils #   (0-0.2)  k/uL


 


Hypochromasia   


 


Anisocytosis   


 


PT   (9.0-12.0)  sec


 


INR   (<1.2)  


 


APTT   (22.0-30.0)  sec


 


Sodium   (137-145)  mmol/L


 


Potassium   (3.5-5.1)  mmol/L


 


Chloride   ()  mmol/L


 


Carbon Dioxide   (22-30)  mmol/L


 


Anion Gap   mmol/L


 


BUN   (7-17)  mg/dL


 


Creatinine   (0.52-1.04)  mg/dL


 


Est GFR (CKD-EPI)AfAm   (>60 ml/min/1.73 sqM)  


 


Est GFR (CKD-EPI)NonAf   (>60 ml/min/1.73 sqM)  


 


Glucose   (74-99)  mg/dL


 


Calcium   (8.4-10.2)  mg/dL


 


Total Bilirubin   (0.2-1.3)  mg/dL


 


AST   (14-36)  U/L


 


ALT   (4-34)  U/L


 


Alkaline Phosphatase   ()  U/L


 


Troponin I  <0.012  (0.000-0.034)  ng/mL


 


Total Protein   (6.3-8.2)  g/dL


 


Albumin   (3.5-5.0)  g/dL














Disposition


Clinical Impression: 


 Dizziness





Disposition: HOME SELF-CARE


Condition: Fair


Instructions (If sedation given, give patient instructions):  Dizziness (ED)


Prescriptions: 


Meclizine [Antivert] 12.5 mg PO Q8HR PRN #30 tablet


 PRN Reason: Vertigo


Is patient prescribed a controlled substance at d/c from ED?: No


Referrals: 


Carey Weir MD [Primary Care Provider] - 1-2 days


Time of Disposition: 00:33

## 2022-11-19 ENCOUNTER — HOSPITAL ENCOUNTER (EMERGENCY)
Dept: HOSPITAL 47 - EC | Age: 57
Discharge: HOME | End: 2022-11-19
Payer: COMMERCIAL

## 2022-11-19 VITALS — HEART RATE: 92 BPM | SYSTOLIC BLOOD PRESSURE: 119 MMHG | DIASTOLIC BLOOD PRESSURE: 81 MMHG | RESPIRATION RATE: 18 BRPM

## 2022-11-19 VITALS — TEMPERATURE: 97.8 F

## 2022-11-19 DIAGNOSIS — F17.200: ICD-10-CM

## 2022-11-19 DIAGNOSIS — K21.9: ICD-10-CM

## 2022-11-19 DIAGNOSIS — F31.9: ICD-10-CM

## 2022-11-19 DIAGNOSIS — J44.1: Primary | ICD-10-CM

## 2022-11-19 DIAGNOSIS — Z79.899: ICD-10-CM

## 2022-11-19 DIAGNOSIS — Z20.822: ICD-10-CM

## 2022-11-19 DIAGNOSIS — Z79.51: ICD-10-CM

## 2022-11-19 PROCEDURE — 94640 AIRWAY INHALATION TREATMENT: CPT

## 2022-11-19 PROCEDURE — 71046 X-RAY EXAM CHEST 2 VIEWS: CPT

## 2022-11-19 PROCEDURE — 93005 ELECTROCARDIOGRAM TRACING: CPT

## 2022-11-19 PROCEDURE — 87636 SARSCOV2 & INF A&B AMP PRB: CPT

## 2022-11-19 PROCEDURE — 96372 THER/PROPH/DIAG INJ SC/IM: CPT

## 2022-11-19 PROCEDURE — 99285 EMERGENCY DEPT VISIT HI MDM: CPT

## 2022-11-19 NOTE — ED
General Adult HPI





- General


Chief complaint: Upper Respiratory Infection


Stated complaint: sob


Time Seen by Provider: 11/19/22 04:59


Source: patient, family, RN notes reviewed, old records reviewed


Mode of arrival: wheelchair


Limitations: no limitations





- History of Present Illness


Initial comments: 





Patient is a 57-year-old female with past medical history remarkable for COPD, 

asthma, chronic tobacco use who presents emergency Department complaining of 

upper respiratory illness for the last 2 weeks as well as worsening shortness of

breath.  States that she is short of breath secondary to nasal congestion, or 

upper respiratory illness.  Has been attempting to use home inhalers without 

much success.  Was not vaccinated for Covid or flu.  No known sick contacts.  He

endorses a productive cough.  Denies chest pain.  Denies abdominal pain, nausea,

vomiting, diarrhea.  Endorses rhinorrhea.  Denies sore throat.  His no other 

acute complaints at this time.  Presents over concern for worsening shortness of

breath.





- Related Data


                                Home Medications











 Medication  Instructions  Recorded  Confirmed


 


raNITIdine HCL [Ranitidine HCl] 150 mg PO HS 10/19/18 06/05/20


 


Amitriptyline HCl [Elavil] 100 mg PO HS 06/05/20 06/05/20


 


Beclomethasone Dip 80 Mcg/Puff 1 puff INHALATION RT-BID 06/05/20 06/05/20





[Qvar 80 mcg]   


 


Cyanocobalamin (Vitamin B-12) 2,500 mcg PO DAILY 06/05/20 06/05/20





[Vitamin B-12]   


 


Ergocalciferol [Vitamin D2] 50,000 unit PO Q7D 06/05/20 06/05/20


 


PARoxetine HCL [Paxil] 30 mg PO DAILY 06/05/20 06/05/20


 


Simvastatin [Zocor] 5 mg PO HS 06/05/20 06/05/20


 


Triamcinolone 0.025% Cream 1 applic TOPICAL BID 06/05/20 06/05/20





[Kenalog 0.025% Cream]   








                                  Previous Rx's











 Medication  Instructions  Recorded


 


Meclizine [Antivert] 12.5 mg PO Q8HR PRN #30 tablet 09/15/22


 


Albuterol Inhaler [Ventolin Hfa 1 puff INHALATION TID #8 gm 11/19/22





Inhaler]  


 


Doxycycline Hyclate 100 mg PO BID 7 Days #14 capsule 11/19/22


 


predniSONE [Deltasone] 40 mg PO DAILY 5 Days #10 tab 11/19/22











                                    Allergies











Allergy/AdvReac Type Severity Reaction Status Date / Time


 


No Known Allergies Allergy   Verified 09/14/22 22:34














Review of Systems


ROS Statement: 


Those systems with pertinent positive or pertinent negative responses have been 

documented in the HPI.


Review of Systems:


CONST: Denies fever 


EYES: Denies blurry vision 


ENT: Endorses nasal congestion


C/V:  Denies Chest pain


RESP: Endorses shortness of breath


GI: Denies abdominal pain 


: Denies dysuria  


SKIN: Denies rash.


MSK: Denies joint pain.


NEURO: Denies headache 


ROS Other: All systems not noted in ROS Statement are negative.





Past Medical History


Past Medical History: Asthma, Chest Pain / Angina, COPD, Fibromyalgia, 

GERD/Reflux


Additional Past Medical History / Comment(s): bipolar depression, "insomnia"


History of Any Multi-Drug Resistant Organisms: None Reported


Past Surgical History: Tubal Ligation


Past Anesthesia/Blood Transfusion Reactions: No Reported Reaction


Additional Past Anesthesia/Blood Transfusion Reaction / Comment(s): odin

sterphobia. pt stated she has never has a blood transfusion


Past Psychological History: Bipolar, Depression


Smoking Status: Current every day smoker


Past Alcohol Use History: None Reported


Past Drug Use History: None Reported





- Past Family History


  ** Mother


Additional Family Medical History / Comment(s): bipolar depression





  ** Father


Family Medical History: Cancer, Myocardial Infarction (MI)





General Exam





- General Exam Comments


Initial Comments: 





General: Appears in no acute distress.


HEAD:  Normal with no signs of head trauma.


EYES:  PERRLA, EOMI, conjunctiva normal, no discharge.


ENT:  Hearing grossly intact, normal oropharynx.


RESPIRATORY: Bilateral end expiratory wheezing.  No hypoxia.  Minimal increased 

work of breathing.


C/V:  Regular rate and rhythm. S1 and S2 auscultated, no edema, peripheral 

pulses 2+ and intact throughout


ABD:  Abd is soft, nontender, nondistended


EXT: Normal range of motion, no obvious deformity


SKIN:  No rashes or lesions observed on exposed skin.


NEURO: Alert and oriented 4.


Limitations: no limitations





Course


                                   Vital Signs











  11/19/22 11/19/22 11/19/22





  04:49 05:53 06:02


 


Temperature 97.8 F  


 


Pulse Rate 96 82 85


 


Respiratory 24  





Rate   


 


Blood Pressure 142/57  


 


O2 Sat by Pulse 98  





Oximetry   














Medical Decision Making





- Medical Decision Making





Based on the patient's presentation and physical exam, I do believe she is 

likely having a COPD exacerbation and possible upper respiratory infection at 

this time.  Vital signs are within acceptable limits.  We will obtain a 

screening EKG, chest x-ray, vital signs.  She was in agreement with this plan.  

She'll be sent directly treated for COPD with IM Solu-Medrol as well as multiple

 breathing treatments.





EKG shows no signs of acute ischemia.  There is some baseline artifact secondary

 to motion artifact.Chest x-ray as interpreted by myself revealed no acute 

cardiopulmonary process.  No pneumothorax, no infiltrate, no bony traumatic 

injury.





Patient is Covid, flu, RSV negative.





On reevaluation following breathing treatments, patient's work of breathing is 

greatly improved.  Wheezing is improved.  She is no longer coughing.  We 

discussed results for chest x-ray and labs.  I believe it is safe for her to be 

discharged home at this time.  She was in agreement this plan.  We'll 

empirically treat for bronchitis with doxycycline.  She also received 

prescription for prednisone and albuterol inhaler.





I will provide the patient with a prescription for albuterol inhaler, 

prednisone, doxycycline. I instructed the patient to follow up with their PCP in

 the next 1-3 days.  I explained that the patient should return to the emergency

 department if they experience any worsening symptoms. Strict return precautions

 were discussed with the patient. The patient expressed understanding of these 

instructions. I answered all questions that the patient had. The patient was 

discharged home in good condition with their prescriptions and follow up 

information.





- Lab Data


                                   Lab Results











  11/19/22 Range/Units





  05:15 


 


Influenza Type A (PCR)  Not Detected  (Not Detectd)  


 


Influenza Type B (PCR)  Not Detected  (Not Detectd)  


 


RSV (PCR)  Not Detected  (Not Detectd)  


 


SARS-CoV-2 (PCR)  Not Detected  (Not Detectd)  














- EKG Data


-: EKG Interpreted by Me


EKG Comments: 





12-lead Electrocardiogram Interpretation Note





EKG was reviewed and interpreted by myself. 12-lead ECG performed at 0515 is 

interpreted by me as revealing normal sinus rhythm at a rate of 83 beats per 

minute.  Axis is normal.  GA interval is 160 ms, QRS duration is 86 ms, QTc is 

384 ms..  There were no ST or T wave abnormalities to suggest myocardial 

ischemia or injury. R wave progression across the precordium was satisfactory. 

By my interpretation this EKG is non-diagnostic for acute ischemia.  When 

compared with prior EKG from September 2022, no significant change.





Disposition


Clinical Impression: 


 Bronchitis, COPD exacerbation





Disposition: HOME SELF-CARE


Condition: Good


Instructions (If sedation given, give patient instructions):  Acute Bronchitis 

(ED), COPD (Chronic Obstructive Pulmonary Disease) (ED)


Prescriptions: 


predniSONE [Deltasone] 40 mg PO DAILY 5 Days #10 tab


Doxycycline Hyclate 100 mg PO BID 7 Days #14 capsule


Albuterol Inhaler [Ventolin Hfa Inhaler] 1 puff INHALATION TID #8 gm


Is patient prescribed a controlled substance at d/c from ED?: No


Referrals: 


None,Stated [Primary Care Provider] - 1-2 days


Time of Disposition: 06:25

## 2022-11-19 NOTE — XR
EXAMINATION TYPE: XR chest 2V

 

DATE OF EXAM: 11/19/2022

 

COMPARISON: 6/4/2020

 

HISTORY: Cough

 

TECHNIQUE:

 

FINDINGS: Heart is normal. Lungs are clear. Diaphragm is normal. Bony thorax appears normal.

 

IMPRESSION: Normal chest. No change

## 2023-01-20 ENCOUNTER — HOSPITAL ENCOUNTER (EMERGENCY)
Dept: HOSPITAL 47 - EC | Age: 58
Discharge: HOME | End: 2023-01-20
Payer: COMMERCIAL

## 2023-01-20 VITALS — TEMPERATURE: 98.2 F

## 2023-01-20 VITALS — DIASTOLIC BLOOD PRESSURE: 78 MMHG | HEART RATE: 78 BPM | SYSTOLIC BLOOD PRESSURE: 111 MMHG

## 2023-01-20 VITALS — RESPIRATION RATE: 16 BRPM

## 2023-01-20 DIAGNOSIS — J06.9: Primary | ICD-10-CM

## 2023-01-20 DIAGNOSIS — F17.200: ICD-10-CM

## 2023-01-20 DIAGNOSIS — F12.90: ICD-10-CM

## 2023-01-20 DIAGNOSIS — J44.9: ICD-10-CM

## 2023-01-20 DIAGNOSIS — Z20.822: ICD-10-CM

## 2023-01-20 DIAGNOSIS — F31.9: ICD-10-CM

## 2023-01-20 LAB
ALBUMIN SERPL-MCNC: 4.1 G/DL (ref 3.5–5)
ALP SERPL-CCNC: 109 U/L (ref 38–126)
ALT SERPL-CCNC: 19 U/L (ref 4–34)
ANION GAP SERPL CALC-SCNC: 7 MMOL/L
APTT BLD: 23.1 SEC (ref 22–30)
AST SERPL-CCNC: 18 U/L (ref 14–36)
BASOPHILS # BLD AUTO: 0 K/UL (ref 0–0.2)
BASOPHILS NFR BLD AUTO: 1 %
BUN SERPL-SCNC: 12 MG/DL (ref 7–17)
CALCIUM SPEC-MCNC: 9 MG/DL (ref 8.4–10.2)
CHLORIDE SERPL-SCNC: 103 MMOL/L (ref 98–107)
CO2 SERPL-SCNC: 30 MMOL/L (ref 22–30)
EOSINOPHIL # BLD AUTO: 0.1 K/UL (ref 0–0.7)
EOSINOPHIL NFR BLD AUTO: 1 %
ERYTHROCYTE [DISTWIDTH] IN BLOOD BY AUTOMATED COUNT: 4.92 M/UL (ref 3.8–5.4)
ERYTHROCYTE [DISTWIDTH] IN BLOOD: 17.1 % (ref 11.5–15.5)
GLUCOSE SERPL-MCNC: 116 MG/DL (ref 74–99)
HCT VFR BLD AUTO: 40.8 % (ref 34–46)
HGB BLD-MCNC: 13.1 GM/DL (ref 11.4–16)
INR PPP: 0.9 (ref ?–1.2)
LYMPHOCYTES # SPEC AUTO: 1.7 K/UL (ref 1–4.8)
LYMPHOCYTES NFR SPEC AUTO: 20 %
MAGNESIUM SPEC-SCNC: 2.3 MG/DL (ref 1.6–2.3)
MCH RBC QN AUTO: 26.7 PG (ref 25–35)
MCHC RBC AUTO-ENTMCNC: 32.2 G/DL (ref 31–37)
MCV RBC AUTO: 82.8 FL (ref 80–100)
MONOCYTES # BLD AUTO: 0.4 K/UL (ref 0–1)
MONOCYTES NFR BLD AUTO: 5 %
NEUTROPHILS # BLD AUTO: 6 K/UL (ref 1.3–7.7)
NEUTROPHILS NFR BLD AUTO: 72 %
PLATELET # BLD AUTO: 435 K/UL (ref 150–450)
POTASSIUM SERPL-SCNC: 4.3 MMOL/L (ref 3.5–5.1)
PROT SERPL-MCNC: 7 G/DL (ref 6.3–8.2)
PT BLD: 9.3 SEC (ref 9–12)
SODIUM SERPL-SCNC: 140 MMOL/L (ref 137–145)
WBC # BLD AUTO: 8.3 K/UL (ref 3.8–10.6)

## 2023-01-20 PROCEDURE — 71046 X-RAY EXAM CHEST 2 VIEWS: CPT

## 2023-01-20 PROCEDURE — 94640 AIRWAY INHALATION TREATMENT: CPT

## 2023-01-20 PROCEDURE — 85610 PROTHROMBIN TIME: CPT

## 2023-01-20 PROCEDURE — 83735 ASSAY OF MAGNESIUM: CPT

## 2023-01-20 PROCEDURE — 36415 COLL VENOUS BLD VENIPUNCTURE: CPT

## 2023-01-20 PROCEDURE — 87636 SARSCOV2 & INF A&B AMP PRB: CPT

## 2023-01-20 PROCEDURE — 83880 ASSAY OF NATRIURETIC PEPTIDE: CPT

## 2023-01-20 PROCEDURE — 93005 ELECTROCARDIOGRAM TRACING: CPT

## 2023-01-20 PROCEDURE — 85025 COMPLETE CBC W/AUTO DIFF WBC: CPT

## 2023-01-20 PROCEDURE — 84484 ASSAY OF TROPONIN QUANT: CPT

## 2023-01-20 PROCEDURE — 85730 THROMBOPLASTIN TIME PARTIAL: CPT

## 2023-01-20 PROCEDURE — 80053 COMPREHEN METABOLIC PANEL: CPT

## 2023-01-20 PROCEDURE — 99285 EMERGENCY DEPT VISIT HI MDM: CPT

## 2023-01-20 NOTE — ED
General Adult HPI





- General


Chief complaint: Shortness of Breath


Stated complaint: sob,weakness


Time Seen by Provider: 01/20/23 18:10


Source: patient


Mode of arrival: ambulatory


Limitations: no limitations





- History of Present Illness


Initial comments: 


Dictation was produced using dragon dictation software. please excuse any 

grammatical, word or spelling errors. 











Chief Complaint: 57-year-old female presents emergency department for fatigue 

and shortness of breath





History of Present Illness: Patient is a 57-year-old female she has past medical

history of COPD, fibromyalgia.  She presents to the ER for one day fatigue, 

shortness of breath and cough.  Patient states that this feels different from 

her usual COPD she does have some shortness of breath.  Denies any fever or 

chills or night sweats.  No chest pain.  She does have some mild runny nose.  No

obvious sick contacts.








The ROS documented in this emergency department record has been reviewed and 

confirmed by me.  Those systems with pertinent positive or negative responses 

have been documented in the HPI.  All other systems are other negative and/or 

noncontributory.








PHYSICAL EXAM:


General Impression: Alert and oriented x3, not in acute distress


HEENT: Normocephalic atraumatic, extra-ocular movements intact, pupils equal and

reactive to light bilaterally, mucous membranes moist.


Cardiovascular: Heart regular rate and rhythm


Chest: Able to complete full sentences, no retractions, no tachypnea


Abdomen: abdomen soft, non-tender, non-distended, no organomegaly


Musculoskeletal: Pulses present and equal in all extremities, no peripheral 

edema


Motor:  no focal deficits noted


Neurological: CN II-XII grossly intact, no focal motor or sensory deficits noted


Skin: Intact with no visualized rashes


Psych: Normal affect and mood





ED course: 57-year-old male presents emergency department for chief complaint of

fatigue. she also reports shortness of breath.  Symptoms are accompanied with 

cough.  symptoms are suggestive of URI.  Patient  Signs upon arrival are within 

acceptable limits.  Patient's well-appearing in no acute distress.





Nursing notes and chart review was performed





EKG interpreted by me: Ventricular rate 77, sinus rhythm,.  165, QRS 90, QTC 41.

No NE prolongation, no QTC prolongation, no ST or T-wave changes noted.    

Overall, this EKG is unremarkable





Laboratory evaluation obtained.  CBC, coag panel, metabolic panel is 

unremarkable.  Abdominal labs negative.  4 panel vital PCR is negative for 

influenza, RSV and COVID-19.  Chest x-ray is nonacute.  Patient observed in 

emergency department for there is an 30 minutes.  Reevaluated at bedside at 9:35

PM found with stable medical condition.  Patient is well-appearing and does not 

show any signs of distress.  Patient discharged.








Was pt. sent in by a medical professional or institution (RAHUL Crow, NP, urgent 

care, hospital, or nursing home...) When possible be specific


@  -No


Did you speak to anyone other than the patient for history (EMS, parent, family,

police, friend...)? What history was obtained from this source 


@  -No


Did you review nursing and triage notes (agree or disagree)?  Why? 


@  -I reviewed and agree with nursing and triage notes


Were old charts reviewed (outside hosp., previous admission, EMS record, old 

EKG, old radiological studies, urgent care reports/EKG's, nursing home records)?

Report findings 


@  -No old charts were reviewed


Differential Diagnosis (chest pain, altered mental status, abdominal pain women,

abdominal pain men, vaginal bleeding, weakness, fever, dyspnea, syncope, 

headache, dizziness, GI bleed, back pain, seizure, CVA, palpatations, mental 

health)? 


@  -not applicable


EKG interpreted by me (3pts min.).


@  -As above


X-rays interpreted by me (1pt min.).


@  -As above


CT interpreted by me (1pt min.).


@  -None done


U/S interpreted by me (1pt. min.).


@  -None done


What testing was considered but not performed or refused? (CT, X-rays, U/S, 

labs)? Why?


@  -D-dimer was considered however patient's not tachycardic, not hypoxic non-

dyspneic she does not take beta blockers.


What meds were considered but not given or refused? Why?


@  -See above


Did you discuss the management of the patient with other professionals 

(professionals i.e. RAHUL Crow, NP, lab, RT, psych nurse, , , 

teacher, , )? Give summary


@  -No


Was smoking cessation discussed for >3mins.?


@  -Yes


Was critical care preformed (if so, how long)?


@  -No


Were there social determinants of health that impacted care today? How? 

(Homelessness, low income, unemployed, alcoholism, drug addiction, 

transportation, low edu. Level, literacy, decrease access to med. care, snf, 

rehab)?


@  -No


Was there de-escalation of care discussed even if they declined (Discuss DNR or 

withdrawal of care, Hospice)? DNR status


@  -No


What co-morbidities impacted this encounter? (DM, HTN, Smoking, COPD, CAD, 

Cancer, CVA, ARF, Chemo, Hep., AIDS, mental health diagnosis, sleep apnea, 

morbid obesity)?


@  -None


Was patient admitted / discharged? Hospital course, mention meds given and 

route, prescriptions, significant lab abnormalities, going to OR and other 

pertinent info.


@  -See above 


Undiagnosed new problem with uncertain prognosis?


@  -No


Drug Therapy requiring intensive monitoring for toxicity (Heparin, Nitro, In

sulin, Cardizem)?


@  -No


Were any procedures done?


@  -No


Diagnosis/symptom?


@  -Upper respiratory infection


Acute, or Chronic, or Acute on Chronic?


@  -Acute


Uncomplicated (without systemic symptoms) or Complicated (systemic symptoms)?


@  -Uncomplicated


Side effects of treatment?


@  -No


Exacerbation, Progression, or Severe Exacerbation?


@  -No


Poses a threat to life or bodily function? How? (Chest pain, USA, MI, pneumonia,

PE, COPD, DKA, ARF, appy, cholecystitis, CVA, Diverticulitis, Homicidal, 

Suicidal, threat to staff... and all critical care pts)


@  -No





- Related Data


                                Home Medications











 Medication  Instructions  Recorded  Confirmed


 


Amitriptyline HCl [Elavil] 150 mg PO HS 01/20/23 01/20/23


 


PARoxetine HCL [Paxil] 40 mg PO HS 01/20/23 01/20/23








                                  Previous Rx's











 Medication  Instructions  Recorded


 


Albuterol Sulfate [Proair Hfa] 1 - 2 puff INHALATION Q6HR PRN 01/20/23





 #8.5 gm 











                                    Allergies











Allergy/AdvReac Type Severity Reaction Status Date / Time


 


No Known Allergies Allergy   Verified 01/20/23 21:00














Review of Systems


ROS Statement: 


Those systems with pertinent positive or pertinent negative responses have been 

documented in the HPI.





ROS Other: All systems not noted in ROS Statement are negative.





Past Medical History


Past Medical History: Asthma, Chest Pain / Angina, COPD, Fibromyalgia, 

GERD/Reflux


Additional Past Medical History / Comment(s): bipolar depression, "insomnia"


History of Any Multi-Drug Resistant Organisms: None Reported


Past Surgical History: Tubal Ligation


Past Anesthesia/Blood Transfusion Reactions: No Reported Reaction


Additional Past Anesthesia/Blood Transfusion Reaction / Comment(s): 

clausterphobia. pt stated she has never has a blood transfusion


Past Psychological History: Bipolar, Depression


Smoking Status: Current every day smoker


Past Alcohol Use History: None Reported


Past Drug Use History: Marijuana





- Past Family History


  ** Mother


Additional Family Medical History / Comment(s): bipolar depression





  ** Father


Family Medical History: Cancer, Myocardial Infarction (MI)





General Exam


Limitations: no limitations





Course


                                   Vital Signs











  01/20/23 01/20/23 01/20/23





  18:04 19:34 19:45


 


Temperature 98.2 F  


 


Pulse Rate 86 73 72


 


Respiratory 22  





Rate   


 


Blood Pressure 127/67  


 


O2 Sat by Pulse 96  





Oximetry   














  01/20/23





  20:00


 


Temperature 


 


Pulse Rate 75


 


Respiratory 16





Rate 


 


Blood Pressure 110/66


 


O2 Sat by Pulse 93 L





Oximetry 














Medical Decision Making





- Lab Data


Result diagrams: 


                                 01/20/23 18:52





                                 01/20/23 18:52


                                   Lab Results











  01/20/23 01/20/23 01/20/23 Range/Units





  18:52 18:52 18:52 


 


WBC  8.3    (3.8-10.6)  k/uL


 


RBC  4.92    (3.80-5.40)  m/uL


 


Hgb  13.1    (11.4-16.0)  gm/dL


 


Hct  40.8    (34.0-46.0)  %


 


MCV  82.8    (80.0-100.0)  fL


 


MCH  26.7    (25.0-35.0)  pg


 


MCHC  32.2    (31.0-37.0)  g/dL


 


RDW  17.1 H    (11.5-15.5)  %


 


Plt Count  435    (150-450)  k/uL


 


MPV  6.8    


 


Neutrophils %  72    %


 


Lymphocytes %  20    %


 


Monocytes %  5    %


 


Eosinophils %  1    %


 


Basophils %  1    %


 


Neutrophils #  6.0    (1.3-7.7)  k/uL


 


Lymphocytes #  1.7    (1.0-4.8)  k/uL


 


Monocytes #  0.4    (0-1.0)  k/uL


 


Eosinophils #  0.1    (0-0.7)  k/uL


 


Basophils #  0.0    (0-0.2)  k/uL


 


Anisocytosis  Slight    


 


PT   9.3   (9.0-12.0)  sec


 


INR   0.9   (<1.2)  


 


APTT   23.1   (22.0-30.0)  sec


 


Sodium    140  (137-145)  mmol/L


 


Potassium    4.3  (3.5-5.1)  mmol/L


 


Chloride    103  ()  mmol/L


 


Carbon Dioxide    30  (22-30)  mmol/L


 


Anion Gap    7  mmol/L


 


BUN    12  (7-17)  mg/dL


 


Creatinine    0.72  (0.52-1.04)  mg/dL


 


Est GFR (CKD-EPI)AfAm    >90  (>60 ml/min/1.73 sqM)  


 


Est GFR (CKD-EPI)NonAf    >90  (>60 ml/min/1.73 sqM)  


 


Glucose    116 H  (74-99)  mg/dL


 


Calcium    9.0  (8.4-10.2)  mg/dL


 


Magnesium    2.3  (1.6-2.3)  mg/dL


 


Total Bilirubin    0.3  (0.2-1.3)  mg/dL


 


AST    18  (14-36)  U/L


 


ALT    19  (4-34)  U/L


 


Alkaline Phosphatase    109  ()  U/L


 


Troponin I     (0.000-0.034)  ng/mL


 


NT-Pro-B Natriuret Pep     pg/mL


 


Total Protein    7.0  (6.3-8.2)  g/dL


 


Albumin    4.1  (3.5-5.0)  g/dL


 


Influenza Type A (PCR)     (Not Detectd)  


 


Influenza Type B (PCR)     (Not Detectd)  


 


RSV (PCR)     (Not Detectd)  


 


SARS-CoV-2 (PCR)     (Not Detectd)  














  01/20/23 01/20/23 01/20/23 Range/Units





  18:52 18:52 18:52 


 


WBC     (3.8-10.6)  k/uL


 


RBC     (3.80-5.40)  m/uL


 


Hgb     (11.4-16.0)  gm/dL


 


Hct     (34.0-46.0)  %


 


MCV     (80.0-100.0)  fL


 


MCH     (25.0-35.0)  pg


 


MCHC     (31.0-37.0)  g/dL


 


RDW     (11.5-15.5)  %


 


Plt Count     (150-450)  k/uL


 


MPV     


 


Neutrophils %     %


 


Lymphocytes %     %


 


Monocytes %     %


 


Eosinophils %     %


 


Basophils %     %


 


Neutrophils #     (1.3-7.7)  k/uL


 


Lymphocytes #     (1.0-4.8)  k/uL


 


Monocytes #     (0-1.0)  k/uL


 


Eosinophils #     (0-0.7)  k/uL


 


Basophils #     (0-0.2)  k/uL


 


Anisocytosis     


 


PT     (9.0-12.0)  sec


 


INR     (<1.2)  


 


APTT     (22.0-30.0)  sec


 


Sodium     (137-145)  mmol/L


 


Potassium     (3.5-5.1)  mmol/L


 


Chloride     ()  mmol/L


 


Carbon Dioxide     (22-30)  mmol/L


 


Anion Gap     mmol/L


 


BUN     (7-17)  mg/dL


 


Creatinine     (0.52-1.04)  mg/dL


 


Est GFR (CKD-EPI)AfAm     (>60 ml/min/1.73 sqM)  


 


Est GFR (CKD-EPI)NonAf     (>60 ml/min/1.73 sqM)  


 


Glucose     (74-99)  mg/dL


 


Calcium     (8.4-10.2)  mg/dL


 


Magnesium     (1.6-2.3)  mg/dL


 


Total Bilirubin     (0.2-1.3)  mg/dL


 


AST     (14-36)  U/L


 


ALT     (4-34)  U/L


 


Alkaline Phosphatase     ()  U/L


 


Troponin I  <0.012    (0.000-0.034)  ng/mL


 


NT-Pro-B Natriuret Pep   35   pg/mL


 


Total Protein     (6.3-8.2)  g/dL


 


Albumin     (3.5-5.0)  g/dL


 


Influenza Type A (PCR)    Not Detected  (Not Detectd)  


 


Influenza Type B (PCR)    Not Detected  (Not Detectd)  


 


RSV (PCR)    Not Detected  (Not Detectd)  


 


SARS-CoV-2 (PCR)    Not Detected  (Not Detectd)  














Disposition


Clinical Impression: 


 URI (upper respiratory infection)





Disposition: HOME SELF-CARE


Condition: Good


Instructions (If sedation given, give patient instructions):  Upper Respiratory 

Infection (ED)


Prescriptions: 


Albuterol Sulfate [Proair Hfa] 1 - 2 puff INHALATION Q6HR PRN #8.5 gm


 PRN Reason: Dyspnea


Is patient prescribed a controlled substance at d/c from ED?: No


Referrals: 


None,Stated [Primary Care Provider] - 1-2 days


Time of Disposition: 21:39

## 2023-01-20 NOTE — XR
EXAMINATION TYPE: XR chest 2V

 

DATE OF EXAM: 1/20/2023

 

COMPARISON: 11/19/2022

 

HISTORY: Chest pain

 

TECHNIQUE: 

FINDINGS:

Heart and mediastinum are normal. Lungs are clear. Diaphragm is normal. Bony thorax appears normal.

 

IMPRESSION: Normal chest. No change.

## 2023-08-09 ENCOUNTER — HOSPITAL ENCOUNTER (OUTPATIENT)
Dept: HOSPITAL 47 - EC | Age: 58
Setting detail: OBSERVATION
LOS: 2 days | Discharge: LEFT BEFORE DISCHARGE | End: 2023-08-11
Attending: INTERNAL MEDICINE | Admitting: INTERNAL MEDICINE
Payer: COMMERCIAL

## 2023-08-09 DIAGNOSIS — M79.7: ICD-10-CM

## 2023-08-09 DIAGNOSIS — Z79.899: ICD-10-CM

## 2023-08-09 DIAGNOSIS — F31.9: ICD-10-CM

## 2023-08-09 DIAGNOSIS — Z53.29: ICD-10-CM

## 2023-08-09 DIAGNOSIS — K21.9: ICD-10-CM

## 2023-08-09 DIAGNOSIS — T43.212A: Primary | ICD-10-CM

## 2023-08-09 DIAGNOSIS — J44.9: ICD-10-CM

## 2023-08-09 DIAGNOSIS — G92.8: ICD-10-CM

## 2023-08-09 DIAGNOSIS — F17.200: ICD-10-CM

## 2023-08-09 DIAGNOSIS — Z56.0: ICD-10-CM

## 2023-08-09 LAB
ALBUMIN SERPL-MCNC: 4.4 G/DL (ref 3.5–5)
ALP SERPL-CCNC: 110 U/L (ref 38–126)
ALT SERPL-CCNC: 20 U/L (ref 4–34)
ANION GAP SERPL CALC-SCNC: 12 MMOL/L
APAP SPEC-MCNC: <10 UG/ML
AST SERPL-CCNC: 21 U/L (ref 14–36)
BASOPHILS # BLD AUTO: 0 K/UL (ref 0–0.2)
BASOPHILS NFR BLD AUTO: 0 %
BUN SERPL-SCNC: 10 MG/DL (ref 7–17)
CALCIUM SPEC-MCNC: 9.4 MG/DL (ref 8.4–10.2)
CHLORIDE SERPL-SCNC: 104 MMOL/L (ref 98–107)
CO2 SERPL-SCNC: 22 MMOL/L (ref 22–30)
EOSINOPHIL # BLD AUTO: 0.1 K/UL (ref 0–0.7)
EOSINOPHIL NFR BLD AUTO: 1 %
ERYTHROCYTE [DISTWIDTH] IN BLOOD BY AUTOMATED COUNT: 4.99 M/UL (ref 3.8–5.4)
ERYTHROCYTE [DISTWIDTH] IN BLOOD: 15.2 % (ref 11.5–15.5)
GLUCOSE SERPL-MCNC: 123 MG/DL (ref 74–99)
HCT VFR BLD AUTO: 44.2 % (ref 34–46)
HGB BLD-MCNC: 14.6 GM/DL (ref 11.4–16)
LYMPHOCYTES # SPEC AUTO: 1.5 K/UL (ref 1–4.8)
LYMPHOCYTES NFR SPEC AUTO: 14 %
MCH RBC QN AUTO: 29.2 PG (ref 25–35)
MCHC RBC AUTO-ENTMCNC: 33 G/DL (ref 31–37)
MCV RBC AUTO: 88.7 FL (ref 80–100)
MONOCYTES # BLD AUTO: 0.5 K/UL (ref 0–1)
MONOCYTES NFR BLD AUTO: 5 %
NEUTROPHILS # BLD AUTO: 8.6 K/UL (ref 1.3–7.7)
NEUTROPHILS NFR BLD AUTO: 80 %
PH UR: 6.5 [PH] (ref 5–8)
PLATELET # BLD AUTO: 345 K/UL (ref 150–450)
POTASSIUM SERPL-SCNC: 4 MMOL/L (ref 3.5–5.1)
PROT SERPL-MCNC: 7.5 G/DL (ref 6.3–8.2)
RBC UR QL: 2 /HPF (ref 0–5)
SALICYLATES SERPL-MCNC: <1 MG/DL
SODIUM SERPL-SCNC: 138 MMOL/L (ref 137–145)
SP GR UR: 1.03 (ref 1–1.03)
SQUAMOUS UR QL AUTO: 3 /HPF (ref 0–4)
UROBILINOGEN UR QL STRIP: 3 MG/DL (ref ?–2)
WBC # BLD AUTO: 10.8 K/UL (ref 3.8–10.6)
WBC #/AREA URNS HPF: 1 /HPF (ref 0–5)

## 2023-08-09 PROCEDURE — 80320 DRUG SCREEN QUANTALCOHOLS: CPT

## 2023-08-09 PROCEDURE — 85025 COMPLETE CBC W/AUTO DIFF WBC: CPT

## 2023-08-09 PROCEDURE — 96376 TX/PRO/DX INJ SAME DRUG ADON: CPT

## 2023-08-09 PROCEDURE — 93005 ELECTROCARDIOGRAM TRACING: CPT

## 2023-08-09 PROCEDURE — 80143 DRUG ASSAY ACETAMINOPHEN: CPT

## 2023-08-09 PROCEDURE — 80053 COMPREHEN METABOLIC PANEL: CPT

## 2023-08-09 PROCEDURE — 80179 DRUG ASSAY SALICYLATE: CPT

## 2023-08-09 PROCEDURE — 36415 COLL VENOUS BLD VENIPUNCTURE: CPT

## 2023-08-09 PROCEDURE — 94760 N-INVAS EAR/PLS OXIMETRY 1: CPT

## 2023-08-09 PROCEDURE — 96375 TX/PRO/DX INJ NEW DRUG ADDON: CPT

## 2023-08-09 PROCEDURE — 99285 EMERGENCY DEPT VISIT HI MDM: CPT

## 2023-08-09 PROCEDURE — 81025 URINE PREGNANCY TEST: CPT

## 2023-08-09 PROCEDURE — 80306 DRUG TEST PRSMV INSTRMNT: CPT

## 2023-08-09 PROCEDURE — 81001 URINALYSIS AUTO W/SCOPE: CPT

## 2023-08-09 PROCEDURE — 96374 THER/PROPH/DIAG INJ IV PUSH: CPT

## 2023-08-09 RX ADMIN — CEFAZOLIN SCH MLS/HR: 330 INJECTION, POWDER, FOR SOLUTION INTRAMUSCULAR; INTRAVENOUS at 22:05

## 2023-08-09 SDOH — ECONOMIC STABILITY - INCOME SECURITY: UNEMPLOYMENT, UNSPECIFIED: Z56.0

## 2023-08-09 NOTE — ED
Overdose HPI





- General


Chief Complaint: Overdose


Stated Complaint: overdose


Time Seen by Provider: 08/09/23 19:20


Source: patient, RN notes reviewed, old records reviewed


Mode of arrival: wheelchair


Limitations: no limitations





- History of Present Illness


Initial Comments: 





This is a 58-year-old female for unknown overdose.  Patient has been going 

through a lot of increased stress and likely suffering from depression per 

family at bedside.  Patient did take overdose of Aleve believe was Cymbalta and 

patient presents to ER somnolent.  She does go through phases of anger and 

fighting requiring chemical restraint here in the ER.


MD Complaint: intentional overdose, accidental overdose, other (Unsure reason 

for overdose)


-: unknown


Intent: unwilling to say


Associated Symptoms: depression


Treatments Prior to Arrival: none





- Related Data


                                Home Medications











 Medication  Instructions  Recorded  Confirmed


 


Albuterol Sulfate [Proair Hfa] 1 - 2 puff INHALATION RT-Q6H PRN 08/09/23 08/09/23


 


Atorvastatin [Lipitor] 20 mg PO DAILY 08/09/23 08/09/23


 


DULoxetine HCL [Cymbalta] 60 mg PO DAILY 08/09/23 08/09/23


 


Ferrous Sulfate [Feosol] 325 mg PO BID 08/09/23 08/09/23


 


Omeprazole 20 mg PO DAILY 08/09/23 08/09/23











                                    Allergies











Allergy/AdvReac Type Severity Reaction Status Date / Time


 


No Known Allergies Allergy   Verified 08/09/23 19:15














Review of Systems


ROS Statement: 


Those systems with pertinent positive or pertinent negative responses have been 

documented in the HPI.





ROS Other: All systems not noted in ROS Statement are negative.





Past Medical History


Past Medical History: Asthma, Chest Pain / Angina, COPD, Fibromyalgia, 

GERD/Reflux


Additional Past Medical History / Comment(s): bipolar depression, "insomnia"


History of Any Multi-Drug Resistant Organisms: None Reported


Past Surgical History: Tubal Ligation


Past Anesthesia/Blood Transfusion Reactions: No Reported Reaction


Additional Past Anesthesia/Blood Transfusion Reaction / Comment(s): 

clausterphobia. pt stated she has never has a blood transfusion


Past Psychological History: Bipolar, Depression


Smoking Status: Current every day smoker


Past Alcohol Use History: None Reported


Past Drug Use History: Marijuana





- Past Family History


  ** Mother


Additional Family Medical History / Comment(s): bipolar depression





  ** Father


Family Medical History: Cancer, Myocardial Infarction (MI)





General Exam


Limitations: no limitations


General appearance: alert, in no apparent distress


Head exam: Present: atraumatic, normocephalic, normal inspection


Eye exam: Present: normal appearance, PERRL, EOMI.  Absent: scleral icterus, 

conjunctival injection, periorbital swelling


ENT exam: Present: normal exam, mucous membranes moist


Neck exam: Present: normal inspection.  Absent: tenderness, meningismus, 

lymphadenopathy


Respiratory exam: Present: normal lung sounds bilaterally.  Absent: respiratory 

distress, wheezes, rales, rhonchi, stridor


Cardiovascular Exam: Present: regular rate, normal rhythm, normal heart sounds. 

 Absent: systolic murmur, diastolic murmur, rubs, gallop, clicks


GI/Abdominal exam: Present: soft, normal bowel sounds.  Absent: distended, t

enderness, guarding, rebound, rigid


Extremities exam: Present: normal inspection, full ROM, normal capillary refill.

  Absent: tenderness, pedal edema, joint swelling, calf tenderness


Back exam: Present: normal inspection


Neurological exam: Present: alert, oriented X3, CN II-XII intact


Psychiatric exam: Present: normal affect, normal mood


Skin exam: Present: warm, dry, intact, normal color.  Absent: rash





Course


                                   Vital Signs











  08/09/23 08/09/23 08/09/23





  19:12 20:23 20:45


 


Temperature 99.0 F  


 


Pulse Rate 94 91 92


 


Respiratory 14 20 20





Rate   


 


Blood Pressure 140/103 121/93 140/92


 


O2 Sat by Pulse 95  90 L





Oximetry   














  08/09/23 08/09/23 08/09/23





  21:05 21:31 22:12


 


Temperature   


 


Pulse Rate 92  93


 


Respiratory 19  18





Rate   


 


Blood Pressure 121/87  126/84


 


O2 Sat by Pulse 93 L 90 L 89 L





Oximetry   














  08/09/23 08/10/23





  23:01 00:11


 


Temperature  98.7 F


 


Pulse Rate 93 94


 


Respiratory 17 18





Rate  


 


Blood Pressure 118/74 119/82


 


O2 Sat by Pulse 90 L 90 L





Oximetry  














- Reevaluation(s)


Reevaluation #1: 





08/09/23 21:48


Medical record is reviewed


Reevaluation #2: 





08/09/23 21:48


Patient symptoms are unchanged if not more somnolent currently


Reevaluation #3: 





08/09/23 21:48


Patient informed of results and questions answered, she is arousable


Reevaluation #4: 





08/09/23 21:48


Was pt. sent in by a medical professional or institution (RAHUL Crow, NP, urgent 

care, hospital, or nursing home...) When possible be specific


@  -no


Did you speak to anyone other than the patient for history (EMS, parent, family,

 police, friend...)? What history was obtained from this source 


@  -no


Did you review nursing and triage notes (agree or disagree)?  Why? 


@  -agree


Are old charts reviewed (outside hosp., previous admission, EMS record, old EKG,

 old radiological studies, urgent care reports/EKG's, nursing home records)? 

Report findings 


@  -yes


Differential Diagnosis (chest pain, altered mental status, abdominal pain women,

 abdominal pain men, vaginal bleeding, weakness, fever, dyspnea, syncope, 

headache, dizziness, GI bleed, back pain, seizure, CVA, palpatations, mental h

ealth, musculoskeletal)? 


@  -prior


EKG interpreted by me (3pts min.).


@  -yes


X-rays interpreted by me (1pt min.).


@  -no


CT interpreted by me (1pt min.).


@  -no


U/S interpreted by me (1pt. min.).


@  -no


What testing was considered but not performed or refused? (CT, X-rays, U/S, 

labs)? Why?


@  -none


What meds were considered but not given or refused? Why?


@  -none


Did you discuss the management of the patient with other professionals 

(professionals i.e. RAHUL Crow, NP, lab, RT, psych nurse, , , 

teacher, , )? Give summary


@  -no


Was smoking cessation discussed for >3mins.?


@  -no


Was critical care preformed (if so, how long)?


@  -no


Were there social determinants of health that impacted care today? How? 

(Homelessness, low income, unemployed, alcoholism, drug addiction, 

transportation, low edu. Level, literacy, decrease access to med. care, [facility], 

rehab)?


@  -none


Was there de-escalation of care discussed even if they declined (Discuss DNR or 

withdrawal of care, Hospice)? DNR status


@  -no


What co-morbidities impacted this encounter? (DM, HTN, Smoking, COPD, CAD, 

Cancer, CVA, ARF, Chemo, Hep., AIDS, mental health diagnosis, sleep apnea, morbi

d obesity)?


@  -none


Was patient admitted / discharged? Hospital course, mention meds given and rout

e, prescriptions, significant lab abnormalities, going to OR and other pertinent

 info.


@  - 58 female to the ER for overdose unsure of accidental although patient has 

been severely depressed recently could be intentional overdose with suicide 

attempt.  Patient [place] for psychiatric evaluation and monitoring of 

overdose that she is currently significantly somnolent


Admitted


Undiagnosed new problem with uncertain prognosis?


@  -no


Drug Therapy requiring intensive monitoring for toxicity (Heparin, Nitro, 

Insulin, Cardizem)?


@  -no


Were any procedures done?


@  -no


Diagnosis/symptom?


@  -Overdose, depression suicidal intention


Acute, or Chronic, or Acute on Chronic?


@  -Acute


Uncomplicated (without systemic symptoms) or Complicated (systemic symptoms)?


@  -Complicated


Side effects of treatment?


@  -no


Exacerbation, Progression, or Severe Exacerbation?


@  -exacerbation


Poses a threat to life or bodily function? How? (Chest pain, USA, MI, pneumonia,

 PE, COPD, DKA, ARF, appy, cholecystitis, CVA, Diverticulitis, Homicidal, 

Suicidal, threat to staff... and all critical care pts)


@  -yes with overdose and suicidal


Reevaluation #5: 





08/09/23 21:48


Differential Altered Mental Status:


Hypoglycemia, DKA, hypercapnia, ETOH, overdose, CO poisoning, trauma, myxedema 

coma, HTN encephalopathy, infection, encephalitis, psychosis, intercranial 

hemorrhage, hepatic encephalopathy, meningitis, CVA, this is not meant to be an 

all-inclusive list





- Consultations


Consultation #1: 





Spoke with sound whose okay for admission





Medical Decision Making





- Medical Decision Making





58 female to the ER for overdose unsure of accidental although patient has been 

severely depressed recently could be intentional overdose with suicide attempt. 

 Patient will be admitted today for psychiatric evaluation as she is currently 

not medically clear.  Patient will also need evaluation and monitoring of 

overdose because of the fact that she is currently significantly somnolent and 

continues to be





- Lab Data


Result diagrams: 


                                 08/09/23 19:40





                                 08/09/23 19:40


                                   Lab Results











  08/09/23 08/09/23 08/09/23 Range/Units





  19:40 19:40 19:40 


 


WBC  10.8 H    (3.8-10.6)  k/uL


 


RBC  4.99    (3.80-5.40)  m/uL


 


Hgb  14.6    (11.4-16.0)  gm/dL


 


Hct  44.2    (34.0-46.0)  %


 


MCV  88.7    (80.0-100.0)  fL


 


MCH  29.2    (25.0-35.0)  pg


 


MCHC  33.0    (31.0-37.0)  g/dL


 


RDW  15.2    (11.5-15.5)  %


 


Plt Count  345    (150-450)  k/uL


 


MPV  6.9    


 


Neutrophils %  80    %


 


Lymphocytes %  14    %


 


Monocytes %  5    %


 


Eosinophils %  1    %


 


Basophils %  0    %


 


Neutrophils #  8.6 H    (1.3-7.7)  k/uL


 


Lymphocytes #  1.5    (1.0-4.8)  k/uL


 


Monocytes #  0.5    (0-1.0)  k/uL


 


Eosinophils #  0.1    (0-0.7)  k/uL


 


Basophils #  0.0    (0-0.2)  k/uL


 


Sodium     (137-145)  mmol/L


 


Potassium     (3.5-5.1)  mmol/L


 


Chloride     ()  mmol/L


 


Carbon Dioxide     (22-30)  mmol/L


 


Anion Gap     mmol/L


 


BUN     (7-17)  mg/dL


 


Creatinine     (0.52-1.04)  mg/dL


 


Est GFR (CKD-EPI)AfAm     (>60 ml/min/1.73 sqM)  


 


Est GFR (CKD-EPI)NonAf     (>60 ml/min/1.73 sqM)  


 


Glucose     (74-99)  mg/dL


 


Calcium     (8.4-10.2)  mg/dL


 


Total Bilirubin     (0.2-1.3)  mg/dL


 


AST     (14-36)  U/L


 


ALT     (4-34)  U/L


 


Alkaline Phosphatase     ()  U/L


 


Total Protein     (6.3-8.2)  g/dL


 


Albumin     (3.5-5.0)  g/dL


 


Urine Color     


 


Urine Appearance     (Clear)  


 


Urine pH     (5.0-8.0)  


 


Ur Specific Gravity     (1.001-1.035)  


 


Urine Protein     (Negative)  


 


Urine Glucose (UA)     (Negative)  


 


Urine Ketones     (Negative)  


 


Urine Blood     (Negative)  


 


Urine Nitrite     (Negative)  


 


Urine Bilirubin     (Negative)  


 


Urine Urobilinogen     (<2.0)  mg/dL


 


Ur Leukocyte Esterase     (Negative)  


 


Urine RBC     (0-5)  /hpf


 


Urine WBC     (0-5)  /hpf


 


Ur Squamous Epith Cells     (0-4)  /hpf


 


Urine Mucus     (None)  /hpf


 


Urine HCG, Qual   Not Detected   (Not Detectd)  


 


Salicylates     mg/dL


 


Urine Opiates Screen    Not Detected  (NotDetected)  


 


Ur Oxycodone Screen    Detected H  (NotDetected)  


 


Urine Methadone Screen    Not Detected  (NotDetected)  


 


Ur Propoxyphene Screen    Not Detected  (NotDetected)  


 


Acetaminophen     ug/mL


 


Ur Barbiturates Screen    Not Detected  (NotDetected)  


 


U Tricyclic Antidepress    Not Detected  (NotDetected)  


 


Ur Phencyclidine Scrn    Not Detected  (NotDetected)  


 


Ur Amphetamines Screen    Not Detected  (NotDetected)  


 


U Methamphetamines Scrn    Not Detected  (NotDetected)  


 


U Benzodiazepines Scrn    Not Detected  (NotDetected)  


 


Urine Cocaine Screen    Not Detected  (NotDetected)  


 


U Marijuana (THC) Screen    Detected H  (NotDetected)  


 


Serum Alcohol     mg/dL














  08/09/23 08/09/23 Range/Units





  19:40 19:40 


 


WBC    (3.8-10.6)  k/uL


 


RBC    (3.80-5.40)  m/uL


 


Hgb    (11.4-16.0)  gm/dL


 


Hct    (34.0-46.0)  %


 


MCV    (80.0-100.0)  fL


 


MCH    (25.0-35.0)  pg


 


MCHC    (31.0-37.0)  g/dL


 


RDW    (11.5-15.5)  %


 


Plt Count    (150-450)  k/uL


 


MPV    


 


Neutrophils %    %


 


Lymphocytes %    %


 


Monocytes %    %


 


Eosinophils %    %


 


Basophils %    %


 


Neutrophils #    (1.3-7.7)  k/uL


 


Lymphocytes #    (1.0-4.8)  k/uL


 


Monocytes #    (0-1.0)  k/uL


 


Eosinophils #    (0-0.7)  k/uL


 


Basophils #    (0-0.2)  k/uL


 


Sodium  138   (137-145)  mmol/L


 


Potassium  4.0   (3.5-5.1)  mmol/L


 


Chloride  104   ()  mmol/L


 


Carbon Dioxide  22   (22-30)  mmol/L


 


Anion Gap  12   mmol/L


 


BUN  10   (7-17)  mg/dL


 


Creatinine  0.67   (0.52-1.04)  mg/dL


 


Est GFR (CKD-EPI)AfAm  >90   (>60 ml/min/1.73 sqM)  


 


Est GFR (CKD-EPI)NonAf  >90   (>60 ml/min/1.73 sqM)  


 


Glucose  123 H   (74-99)  mg/dL


 


Calcium  9.4   (8.4-10.2)  mg/dL


 


Total Bilirubin  0.5   (0.2-1.3)  mg/dL


 


AST  21   (14-36)  U/L


 


ALT  20   (4-34)  U/L


 


Alkaline Phosphatase  110   ()  U/L


 


Total Protein  7.5   (6.3-8.2)  g/dL


 


Albumin  4.4   (3.5-5.0)  g/dL


 


Urine Color   Light Red  


 


Urine Appearance   Clear  (Clear)  


 


Urine pH   6.5  (5.0-8.0)  


 


Ur Specific Gravity   1.026  (1.001-1.035)  


 


Urine Protein   Trace H  (Negative)  


 


Urine Glucose (UA)   Negative  (Negative)  


 


Urine Ketones   Negative  (Negative)  


 


Urine Blood   Negative  (Negative)  


 


Urine Nitrite   Negative  (Negative)  


 


Urine Bilirubin   Negative  (Negative)  


 


Urine Urobilinogen   3.0  (<2.0)  mg/dL


 


Ur Leukocyte Esterase   Trace H  (Negative)  


 


Urine RBC   2  (0-5)  /hpf


 


Urine WBC   1  (0-5)  /hpf


 


Ur Squamous Epith Cells   3  (0-4)  /hpf


 


Urine Mucus   Rare H  (None)  /hpf


 


Urine HCG, Qual    (Not Detectd)  


 


Salicylates  <1.0   mg/dL


 


Urine Opiates Screen    (NotDetected)  


 


Ur Oxycodone Screen    (NotDetected)  


 


Urine Methadone Screen    (NotDetected)  


 


Ur Propoxyphene Screen    (NotDetected)  


 


Acetaminophen  <10.0   ug/mL


 


Ur Barbiturates Screen    (NotDetected)  


 


U Tricyclic Antidepress    (NotDetected)  


 


Ur Phencyclidine Scrn    (NotDetected)  


 


Ur Amphetamines Screen    (NotDetected)  


 


U Methamphetamines Scrn    (NotDetected)  


 


U Benzodiazepines Scrn    (NotDetected)  


 


Urine Cocaine Screen    (NotDetected)  


 


U Marijuana (THC) Screen    (NotDetected)  


 


Serum Alcohol  <10   mg/dL














- EKG Data


-: EKG Interpreted by Me (EKG is sinus 89  QRS 90 )





Disposition


Clinical Impression: 


 Accidental drug overdose, Poisoning by opiates and related narcotics, other, 

Drug overdose





Disposition: ADMITTED AS IP TO THIS HOSP


Condition: Fair


Is patient prescribed a controlled substance at d/c from ED?: No


Time of Disposition: 21:40

## 2023-08-10 RX ADMIN — CEFAZOLIN SCH MLS/HR: 330 INJECTION, POWDER, FOR SOLUTION INTRAMUSCULAR; INTRAVENOUS at 12:44

## 2023-08-10 RX ADMIN — CEFAZOLIN SCH: 330 INJECTION, POWDER, FOR SOLUTION INTRAMUSCULAR; INTRAVENOUS at 07:48

## 2023-08-10 RX ADMIN — ATORVASTATIN CALCIUM SCH: 20 TABLET, FILM COATED ORAL at 07:48

## 2023-08-10 RX ADMIN — PANTOPRAZOLE SODIUM SCH: 40 TABLET, DELAYED RELEASE ORAL at 07:48

## 2023-08-10 NOTE — P.HPIM
History of Present Illness


H&P Date: 08/10/23


Chief Complaint: overdose





58-year-old female with depression





Patient provides very limited history she's somnolent at time of my evaluation 

family not present at bedside





Patient reports intentional overdose on her medications.  She doesn't provide 

any other meaningful history at this time





From reviewing the ED note except she overdosed on Cymbalta possibly.





She currently provides no meaningful history however she denies any medical 

concerns at this time denies any chest pain trouble breathing or any pain.





ED also noted that family reports that she is going through a lot of life 

stressors at this time








 





review of systems


Unable to obtain patient is very somnolent and uninterested in the interview


on exam


Constitutional:          No acute distress, easily arousable does not 

participate with the interview


Eyes:      Anicteric sclerae, moist conjunctiva, 


         Pupils equal round reactive to light





ENMT:      NC/AT


         Oropharynx clear, no erythema, or exudates





Neck:      Supple,  no masses, or JVD


         No carotid bruits


         No thyromegaly





Lungs:      Clear to auscultation


         Clear to percussion


         Normal respiratory effort, no accessory muscle use 





Cardiovascular:      Heart regular in rate and rhythm, 


         No murmurs, gallops, or rubs


         No peripheral edema





Abdominal:       Soft


         Nontender, no guarding, rebound or rigidity


         Abdomen moving with respiration


         Normoactive bowel sounds


         No hepatomegaly, No splenomegaly


         No palpable mass 


         No abdominal wall hernia noted 





Skin:      Normal temperature, tone, texture, turgor


         No induration


         No subcutaneous nodules 


         No rash, lesions


         No ulcers





Extremities:      No digital cyanosis 


         No clubbing


         Pedal pulses intact and symmetrical


         Radial pulses intact and symmetrical 


         No calf tenderness 





Psychiatric:      Sleeping but easily arousable oriented to person and place


Neuro      does not follow my commands does not participate with physical exam


Lymphatics:       no palpable cervical or supraclavicular   lymph nodes  











Past Medical History


Past Medical History: Asthma, Chest Pain / Angina, COPD, Fibromyalgia, 

GERD/Reflux


Additional Past Medical History / Comment(s): bipolar depression, "insomnia"


History of Any Multi-Drug Resistant Organisms: None Reported


Past Surgical History: Tubal Ligation


Past Anesthesia/Blood Transfusion Reactions: No Reported Reaction


Additional Past Anesthesia/Blood Transfusion Reaction / Comment(s): 

clausterphobia. pt stated she has never has a blood transfusion


Past Psychological History: Bipolar, Depression


Additional Psychological History / Comment(s): lives with her mom,daughter and 

grandchild


Smoking Status: Current every day smoker


Past Alcohol Use History: None Reported


Additional Past Alcohol Use History / Comment(s): started smokign at age 

17(1982) smokes 1 ppd


Past Drug Use History: Marijuana





- Past Family History


  ** Mother


Additional Family Medical History / Comment(s): bipolar depression





  ** Father


Family Medical History: Cancer, Myocardial Infarction (MI)





Medications and Allergies


                                Home Medications











 Medication  Instructions  Recorded  Confirmed  Type


 


Albuterol Sulfate [Proair Hfa] 1 - 2 puff INHALATION RT-Q6H PRN 08/09/23 08/09/23 History


 


Atorvastatin [Lipitor] 20 mg PO DAILY 08/09/23 08/09/23 History


 


DULoxetine HCL [Cymbalta] 60 mg PO DAILY 08/09/23 08/09/23 History


 


Ferrous Sulfate [Feosol] 325 mg PO BID 08/09/23 08/09/23 History


 


Omeprazole 20 mg PO DAILY 08/09/23 08/09/23 History








                                    Allergies











Allergy/AdvReac Type Severity Reaction Status Date / Time


 


No Known Allergies Allergy   Verified 08/09/23 19:15














Physical Exam


Vitals: 


                                   Vital Signs











  Temp Pulse Pulse Resp BP BP Pulse Ox


 


 08/10/23 01:40  98.9 F   94  20   116/77  95


 


 08/10/23 00:11  98.7 F  94   18  119/82   90 L


 


 08/09/23 23:01   93   17  118/74   90 L


 


 08/09/23 22:12   93   18  126/84   89 L


 


 08/09/23 21:31        90 L


 


 08/09/23 21:05   92   19  121/87   93 L


 


 08/09/23 20:45   92   20  140/92   90 L


 


 08/09/23 20:23   91   20  121/93  


 


 08/09/23 19:12  99.0 F  94   14  140/103   95








                                Intake and Output











 08/09/23 08/09/23 08/10/23





 14:59 22:59 06:59


 


Other:   


 


  Voiding Method   Diaper





   External Catheter


 


  Weight  81.647 kg 81.647 kg














Results


CBC & Chem 7: 


                                 08/09/23 19:40





                                 08/09/23 19:40


Labs: 


                  Abnormal Lab Results - Last 24 Hours (Table)











  08/09/23 08/09/23 08/09/23 Range/Units





  19:40 19:40 19:40 


 


WBC  10.8 H    (3.8-10.6)  k/uL


 


Neutrophils #  8.6 H    (1.3-7.7)  k/uL


 


Glucose    123 H  (74-99)  mg/dL


 


Urine Protein     (Negative)  


 


Ur Leukocyte Esterase     (Negative)  


 


Urine Mucus     (None)  /hpf


 


Ur Oxycodone Screen   Detected H   (NotDetected)  


 


U Marijuana (THC) Screen   Detected H   (NotDetected)  














  08/09/23 Range/Units





  19:40 


 


WBC   (3.8-10.6)  k/uL


 


Neutrophils #   (1.3-7.7)  k/uL


 


Glucose   (74-99)  mg/dL


 


Urine Protein  Trace H  (Negative)  


 


Ur Leukocyte Esterase  Trace H  (Negative)  


 


Urine Mucus  Rare H  (None)  /hpf


 


Ur Oxycodone Screen   (NotDetected)  


 


U Marijuana (THC) Screen   (NotDetected)  














Assessment and Plan


Assessment: 





58-year-old female with history of depression brought in by family due to 

suspected intentional overdose on her medications and a suicidal attempt I 

discussed the case with ED doctor and accepted the admission for intentional 

drug overdose and a suicidal attempt for psych evaluation with anticipated 

length of stay less than 2 midnights





Acute metabolic encephalopathy secondary to drug overdose


Intentional drug overdose and a suicidal attempt


EKG no acute ST changes, no QT prolongation, normal sinus rhythm


Blood work overall unremarkable white count 10.8, hemoglobin 14.6


When necessary 10 creatinine 0.67


Sodium 138 potassium 4


Urine was positive for marijuana and opiates


Bedside sitter for safety


Psych evaluation for suicidal attempt


Gentle IV fluid hydration normal saline


Monitor vital signs


Monitor urine output





Full code


DVT prophylaxis heparin subcu 3 times a day

## 2023-08-10 NOTE — P.CN
Psychiatric Consult





- .


Consult date: 08/10/23


Consult:: 





08/10/23 14:05


IDENTIFYING DATA: This patient is a , unemployed, 58-year-old  

female with significant history of mood disorder who presented to our hospital 

on 08/09/2023 after intentional overdose on Cymbalta.





HISTORY OF PRESENT ILLNESS: The patient presented to the hospital on 08/09/2023 

after intentional overdose on Cymbalta.  There is also concern that the patient 

overdosed on Aleve as well.  The patient presented to the emergency department 

as somnolent however had intermittent phases of anger and combativeness that 

required chemical restraint as the patient attempted to elope.   The patient was

administered 4 mg of IV Ativan as well as Benadryl.  The patient was petitioned 

by her daughter who reported "Aamir has not been herself since she has been 

off her medication for depression and bipolar. She is not suicidal but she is 

going through personal and housing issues that make her want to give up."   





Psychiatry has been consulted for evaluation of depression and suicide attempt 

by overdose.  Currently, the patient is quite somnolent.  However, the patient 

does report that she has "tired of being tired all the time."  She states that 

she has not been doing well for the past month.  She does admit to overdosing 

intentionally on Cymbalta.  She reports "I took the Cymbalta with the hope that 

I would sleep and not wake up." The patient reports that she is going through 

financial stressors and pending eviction.  She says that this was her first 

attempt at suicide.  The patient is too somnolent to continue the conversation.





PAST PSYCHIATRIC HISTORY: Patient has a reported history of bipolar disorder.  

The patient's current home medications include Cymbalta.  Reportedly, the 

patient is on other psychotropic medications that are unknown at this time.  No 

previous psychiatric admissions.  Patient reportedly follows with outpatient 

psychiatrist.  Patient denies any history of suicide attempts in the past prior 

to this.





PAST MEDICAL HISTORY:  


Past Medical History: Asthma, Chest Pain / Angina, COPD, Fibromyalgia, 

GERD/Reflux


Additional Past Medical History / Comment(s): bipolar depression, "insomnia"


History of Any Multi-Drug Resistant Organisms: None Reported


Past Surgical History: Tubal Ligation


Past Anesthesia/Blood Transfusion Reactions: No Reported Reaction


Additional Past Anesthesia/Blood Transfusion Reaction / Comment(s): 

clausterphobia. pt stated she has never has a blood transfusion


Past Psychological History: Bipolar, Depression


Additional Psychological History / Comment(s): lives with her mom,daughter and 

grandchild


Smoking Status: Current every day smoker


Past Alcohol Use History: None Reported


Additional Past Alcohol Use History / Comment(s): started smokign at age 

17(1982) smokes 1 ppd


Past Drug Use History: Marijuana





ALLERGIES: NKDA 





CHEMICAL DEPENDENCY HISTORY: Reported history of marijuana use.  Patient is 

listed as occurring every day tobacco use.  No reported alcohol use.





FAMILY PSYCHIATRIC/SUBSTANCE USE HISTORY: Unable to assess 





SOCIAL HISTORY: Patient is reportedly .  She is currently facing possible

eviction.





MENTAL STATUS EXAM: 


General Appearance: Patient appears to be stated age is somnolent but attempts 

to cooperate. Patient appears to have fair hygiene and grooming wearing hospital

gown with poor eye contact.  Obese body habitus.


Behavior: Patient is calmly lying in bed without any agitated behavior.


Speech: Patient's speech is fluent and nonpressured.  Nonspontaneous.


Mood/Affect: Patient reports their mood is "very tired", affect is congruent and

somnolent


Suicidality/Homicidality: Patient does not endorse current suicidal or homicidal

ideation however admits to the suicide attempt.


Perceptions: Patient denies any visual hallucinations and denies any auditory 

hallucinations  


Though content/process: Somnolent at this time and cannot be properly assessed.


Memory and concentration: Grossly poor at this time


Judgment and insight: Grossly poor at this time





                                   Vital Signs











Temp  97.3 F L  08/10/23 07:48


 


Pulse  91   08/10/23 13:16


 


Resp  16   08/10/23 11:00


 


BP  127/84   08/10/23 11:00


 


Pulse Ox  93 L  08/10/23 12:35


 


FiO2      








                                 Intake & Output











 08/09/23 08/10/23 08/10/23





 18:59 06:59 18:59


 


Intake Total  1000 


 


Balance  1000 


 


Weight  81.647 kg 


 


Intake:   


 


  Intake, IV Titration  1000 





  Amount   


 


    Sodium Chloride 0.9% 1,  1000 





    000 ml @ 130 mls/hr IV .   





    [id number] [facility] Rx#:[id number]   


 


Other:   


 


  Voiding Method  Diaper Diaper





  External Catheter External Catheter


 


  # Voids   1








                               Laboratory Results











WBC  10.8 k/uL (3.8-10.6)  H  08/09/23  19:40    


 


RBC  4.99 m/uL (3.80-5.40)   08/09/23  19:40    


 


Hgb  14.6 gm/dL (11.4-16.0)   08/09/23  19:40    


 


Hct  44.2 % (34.0-46.0)   08/09/23  19:40    


 


MCV  88.7 fL (80.0-100.0)   08/09/23  19:40    


 


MCH  29.2 pg (25.0-35.0)   08/09/23  19:40    


 


MCHC  33.0 g/dL (31.0-37.0)   08/09/23  19:40    


 


RDW  15.2 % (11.5-15.5)   08/09/23  19:40    


 


Plt Count  345 k/uL (150-450)   08/09/23  19:40    


 


MPV  6.9   08/09/23  19:40    


 


Neutrophils %  80 %  08/09/23  19:40    


 


Lymphocytes %  14 %  08/09/23  19:40    


 


Monocytes %  5 %  08/09/23  19:40    


 


Eosinophils %  1 %  08/09/23  19:40    


 


Basophils %  0 %  08/09/23  19:40    


 


Neutrophils #  8.6 k/uL (1.3-7.7)  H  08/09/23  19:40    


 


Lymphocytes #  1.5 k/uL (1.0-4.8)   08/09/23  19:40    


 


Monocytes #  0.5 k/uL (0-1.0)   08/09/23  19:40    


 


Eosinophils #  0.1 k/uL (0-0.7)   08/09/23  19:40    


 


Basophils #  0.0 k/uL (0-0.2)   08/09/23  19:40    


 


Sodium  138 mmol/L (137-145)   08/09/23  19:40    


 


Potassium  4.0 mmol/L (3.5-5.1)   08/09/23  19:40    


 


Chloride  104 mmol/L ()   08/09/23  19:40    


 


Carbon Dioxide  22 mmol/L (22-30)   08/09/23  19:40    


 


Anion Gap  12 mmol/L  08/09/23  19:40    


 


BUN  10 mg/dL (7-17)   08/09/23  19:40    


 


Creatinine  0.67 mg/dL (0.52-1.04)   08/09/23  19:40    


 


Est GFR (CKD-EPI)AfAm  >90  (>60 ml/min/1.73 sqM)   08/09/23  19:40    


 


Est GFR (CKD-EPI)NonAf  >90  (>60 ml/min/1.73 sqM)   08/09/23  19:40    


 


Glucose  123 mg/dL (74-99)  H  08/09/23  19:40    


 


Calcium  9.4 mg/dL (8.4-10.2)   08/09/23  19:40    


 


Total Bilirubin  0.5 mg/dL (0.2-1.3)   08/09/23  19:40    


 


AST  21 U/L (14-36)   08/09/23  19:40    


 


ALT  20 U/L (4-34)   08/09/23  19:40    


 


Alkaline Phosphatase  110 U/L ()   08/09/23  19:40    


 


Total Protein  7.5 g/dL (6.3-8.2)   08/09/23  19:40    


 


Albumin  4.4 g/dL (3.5-5.0)   08/09/23  19:40    


 


Urine Color  Light Red   08/09/23  19:40    


 


Urine Appearance  Clear  (Clear)   08/09/23  19:40    


 


Urine pH  6.5  (5.0-8.0)   08/09/23  19:40    


 


Ur Specific Gravity  1.026  (1.001-1.035)   08/09/23  19:40    


 


Urine Protein  Trace  (Negative)  H  08/09/23  19:40    


 


Urine Glucose (UA)  Negative  (Negative)   08/09/23  19:40    


 


Urine Ketones  Negative  (Negative)   08/09/23  19:40    


 


Urine Blood  Negative  (Negative)   08/09/23  19:40    


 


Urine Nitrite  Negative  (Negative)   08/09/23  19:40    


 


Urine Bilirubin  Negative  (Negative)   08/09/23  19:40    


 


Urine Urobilinogen  3.0 mg/dL (<2.0)   08/09/23  19:40    


 


Ur Leukocyte Esterase  Trace  (Negative)  H  08/09/23  19:40    


 


Urine RBC  2 /hpf (0-5)   08/09/23  19:40    


 


Urine WBC  1 /hpf (0-5)   08/09/23  19:40    


 


Ur Squamous Epith Cells  3 /hpf (0-4)   08/09/23  19:40    


 


Urine Mucus  Rare /hpf (None)  H  08/09/23  19:40    


 


Urine HCG, Qual  Not Detected  (Not Detectd)   08/09/23  19:40    


 


Salicylates  <1.0 mg/dL  08/09/23  19:40    


 


Urine Opiates Screen  Not Detected  (NotDetected)   08/09/23  19:40    


 


Ur Oxycodone Screen  Detected  (NotDetected)  H  08/09/23  19:40    


 


Urine Methadone Screen  Not Detected  (NotDetected)   08/09/23  19:40    


 


Ur Propoxyphene Screen  Not Detected  (NotDetected)   08/09/23  19:40    


 


Acetaminophen  <10.0 ug/mL  08/09/23  19:40    


 


Ur Barbiturates Screen  Not Detected  (NotDetected)   08/09/23  19:40    


 


U Tricyclic Antidepress  Not Detected  (NotDetected)   08/09/23  19:40    


 


Ur Phencyclidine Scrn  Not Detected  (NotDetected)   08/09/23  19:40    


 


Ur Amphetamines Screen  Not Detected  (NotDetected)   08/09/23  19:40    


 


U Methamphetamines Scrn  Not Detected  (NotDetected)   08/09/23  19:40    


 


U Benzodiazepines Scrn  Not Detected  (NotDetected)   08/09/23  19:40    


 


Urine Cocaine Screen  Not Detected  (NotDetected)   08/09/23  19:40    


 


U Marijuana (THC) Screen  Detected  (NotDetected)  H  08/09/23  19:40    


 


Serum Alcohol  <10 mg/dL  08/09/23  19:40    














IMPRESSIONS: 


Major depressive disorder, severe with suicide attempt


Intentional overdose


Nicotine dependence





PLAN: 


-At this time patient DOES meet criteria for inpatient psychiatric admission.  

The patient admits to overdose with intention to "go to sleep and not wake up." 

The patient has been petitioned.





-Delirium precautions recommended with patient including - avoiding use of 

narcotics and CNS sedatives, limit anticholinergic medications when possible, 

frequent re-orientation, minimize use of restraints, open window shades during 

the day and close them at night





-Would recommend the following medication changes/additions: 


We will hold on psychotropic medications at this time due to the nature of the 

overdose and the patient's somnolence.


We will place as needed orders for agitation should the patient displayed 

agitation and attempt to elope





-EKG reviewed QTc 375 ms





-Continue 1:1 sitter for safety





-Cannot leave AMA at this time. Patient will need a petition and certification 

if attempting to leave AMA.





-Will continue to follow along





-When medically stable, patient is eligible for transfer to a psych bed when 

available. 











08/10/23 14:05

## 2023-08-11 VITALS — SYSTOLIC BLOOD PRESSURE: 118 MMHG | DIASTOLIC BLOOD PRESSURE: 79 MMHG | HEART RATE: 74 BPM | RESPIRATION RATE: 18 BRPM

## 2023-08-11 VITALS — TEMPERATURE: 98.3 F

## 2023-08-11 RX ADMIN — ATORVASTATIN CALCIUM SCH: 20 TABLET, FILM COATED ORAL at 10:18

## 2023-08-11 RX ADMIN — CEFAZOLIN SCH: 330 INJECTION, POWDER, FOR SOLUTION INTRAMUSCULAR; INTRAVENOUS at 11:36

## 2023-08-11 RX ADMIN — CEFAZOLIN SCH: 330 INJECTION, POWDER, FOR SOLUTION INTRAMUSCULAR; INTRAVENOUS at 03:07

## 2023-08-11 RX ADMIN — CEFAZOLIN SCH: 330 INJECTION, POWDER, FOR SOLUTION INTRAMUSCULAR; INTRAVENOUS at 06:08

## 2023-08-11 RX ADMIN — PANTOPRAZOLE SODIUM SCH MG: 40 TABLET, DELAYED RELEASE ORAL at 06:10

## 2023-08-11 NOTE — P.PN
Progress Note - Text


Progress Note Date: 08/11/23





Interval History:


Patient was seen seated upright in her bed and was directable and agreeable to 

speak with writer in the room.  Present for the initial psychotic evaluation was

the safety sitter.  At this time, the patient did report that it was an 

intentional overdose that she has been increasingly stressed.  However, the 

patient is vehemently against inpatient psychiatric admission.  The patient's 

family including her  and son came to visit her and they were adamantly 

requesting for the patient being discharged.  The patient then relented stating 

that she was not suicidal and she had no intention but the overdose.


This provider discussed with the patient and her  Roscoe Ernst, the 

process of inpatient psychiatric admission and the reason for his 

recommendation.  After significant discussion, the patient's family expresses a 

strong desire to forego inpatient psychiatric admission.  An appropriate safety 

plan has been placed in lieu of inpatient psychiatric admission.  The patient 

 maintains that he will monitor her medications, but there are no access 

to firearms or weapons in the home, and that he will set up aftercare 

appointments for the patient.


The patient and her family were informed that this is not the standard of care 

for intentional overdose.  They are agreeable to signing an "AGAINST MEDICAL 

ADVICE" form in order to acknowledge this.





Mental Status Exam:


General Appearance: Patient appears to be stated age is alert, directable, and 

cooperative.  Tearful.


Behavior: Patient is calmly seated without any agitated behavior.


Speech: Patient's speech is fluent and nonpressured. 


Mood/Affect: Mood is improving mildly, affect is congruent and euthymic.


Suicidality/Homicidality:  Patient denies having any suicidal or homicidal 

ideation intent or plan.  


Perceptions: Patient denies any visual hallucinations and denies any auditory 

hallucinations  


Though content/process: There is no evidence of any delusional thought content 

and thought process is linear and goal-directed. 


Memory and concentration: AOX3, grossly intact for the purposes of this session


Judgment and insight: Improving mildly





                                   Vital Signs











Temp  98.3 F   08/11/23 04:00


 


Pulse  74   08/11/23 12:00


 


Resp  18   08/11/23 12:00


 


BP  118/79   08/11/23 12:00


 


Pulse Ox  94 L  08/11/23 12:36


 


FiO2      








                                 Intake & Output











 08/10/23 08/11/23 08/11/23





 18:59 06:59 18:59


 


Intake Total   120


 


Balance   120


 


Intake:   


 


  Oral   120


 


Other:   


 


  Voiding Method Diaper Diaper Diaper





 External Catheter External Catheter External Catheter


 


  # Voids 1 1 1








                               Laboratory Results











WBC  10.8 k/uL (3.8-10.6)  H  08/09/23  19:40    


 


RBC  4.99 m/uL (3.80-5.40)   08/09/23  19:40    


 


Hgb  14.6 gm/dL (11.4-16.0)   08/09/23  19:40    


 


Hct  44.2 % (34.0-46.0)   08/09/23  19:40    


 


MCV  88.7 fL (80.0-100.0)   08/09/23  19:40    


 


MCH  29.2 pg (25.0-35.0)   08/09/23  19:40    


 


MCHC  33.0 g/dL (31.0-37.0)   08/09/23  19:40    


 


RDW  15.2 % (11.5-15.5)   08/09/23  19:40    


 


Plt Count  345 k/uL (150-450)   08/09/23  19:40    


 


MPV  6.9   08/09/23  19:40    


 


Neutrophils %  80 %  08/09/23  19:40    


 


Lymphocytes %  14 %  08/09/23  19:40    


 


Monocytes %  5 %  08/09/23  19:40    


 


Eosinophils %  1 %  08/09/23  19:40    


 


Basophils %  0 %  08/09/23  19:40    


 


Neutrophils #  8.6 k/uL (1.3-7.7)  H  08/09/23  19:40    


 


Lymphocytes #  1.5 k/uL (1.0-4.8)   08/09/23  19:40    


 


Monocytes #  0.5 k/uL (0-1.0)   08/09/23  19:40    


 


Eosinophils #  0.1 k/uL (0-0.7)   08/09/23  19:40    


 


Basophils #  0.0 k/uL (0-0.2)   08/09/23  19:40    


 


Sodium  138 mmol/L (137-145)   08/09/23  19:40    


 


Potassium  4.0 mmol/L (3.5-5.1)   08/09/23  19:40    


 


Chloride  104 mmol/L ()   08/09/23  19:40    


 


Carbon Dioxide  22 mmol/L (22-30)   08/09/23  19:40    


 


Anion Gap  12 mmol/L  08/09/23  19:40    


 


BUN  10 mg/dL (7-17)   08/09/23  19:40    


 


Creatinine  0.67 mg/dL (0.52-1.04)   08/09/23  19:40    


 


Est GFR (CKD-EPI)AfAm  >90  (>60 ml/min/1.73 sqM)   08/09/23  19:40    


 


Est GFR (CKD-EPI)NonAf  >90  (>60 ml/min/1.73 sqM)   08/09/23  19:40    


 


Glucose  123 mg/dL (74-99)  H  08/09/23  19:40    


 


Calcium  9.4 mg/dL (8.4-10.2)   08/09/23  19:40    


 


Total Bilirubin  0.5 mg/dL (0.2-1.3)   08/09/23  19:40    


 


AST  21 U/L (14-36)   08/09/23  19:40    


 


ALT  20 U/L (4-34)   08/09/23  19:40    


 


Alkaline Phosphatase  110 U/L ()   08/09/23  19:40    


 


Total Protein  7.5 g/dL (6.3-8.2)   08/09/23  19:40    


 


Albumin  4.4 g/dL (3.5-5.0)   08/09/23  19:40    


 


Urine Color  Light Red   08/09/23  19:40    


 


Urine Appearance  Clear  (Clear)   08/09/23  19:40    


 


Urine pH  6.5  (5.0-8.0)   08/09/23  19:40    


 


Ur Specific Gravity  1.026  (1.001-1.035)   08/09/23  19:40    


 


Urine Protein  Trace  (Negative)  H  08/09/23  19:40    


 


Urine Glucose (UA)  Negative  (Negative)   08/09/23  19:40    


 


Urine Ketones  Negative  (Negative)   08/09/23  19:40    


 


Urine Blood  Negative  (Negative)   08/09/23  19:40    


 


Urine Nitrite  Negative  (Negative)   08/09/23  19:40    


 


Urine Bilirubin  Negative  (Negative)   08/09/23  19:40    


 


Urine Urobilinogen  3.0 mg/dL (<2.0)   08/09/23  19:40    


 


Ur Leukocyte Esterase  Trace  (Negative)  H  08/09/23  19:40    


 


Urine RBC  2 /hpf (0-5)   08/09/23  19:40    


 


Urine WBC  1 /hpf (0-5)   08/09/23  19:40    


 


Ur Squamous Epith Cells  3 /hpf (0-4)   08/09/23  19:40    


 


Urine Mucus  Rare /hpf (None)  H  08/09/23  19:40    


 


Urine HCG, Qual  Not Detected  (Not Detectd)   08/09/23  19:40    


 


Salicylates  <1.0 mg/dL  08/09/23  19:40    


 


Urine Opiates Screen  Not Detected  (NotDetected)   08/09/23  19:40    


 


Ur Oxycodone Screen  Detected  (NotDetected)  H  08/09/23  19:40    


 


Urine Methadone Screen  Not Detected  (NotDetected)   08/09/23  19:40    


 


Ur Propoxyphene Screen  Not Detected  (NotDetected)   08/09/23  19:40    


 


Acetaminophen  <10.0 ug/mL  08/09/23  19:40    


 


Ur Barbiturates Screen  Not Detected  (NotDetected)   08/09/23  19:40    


 


U Tricyclic Antidepress  Not Detected  (NotDetected)   08/09/23  19:40    


 


Ur Phencyclidine Scrn  Not Detected  (NotDetected)   08/09/23  19:40    


 


Ur Amphetamines Screen  Not Detected  (NotDetected)   08/09/23  19:40    


 


U Methamphetamines Scrn  Not Detected  (NotDetected)   08/09/23  19:40    


 


U Benzodiazepines Scrn  Not Detected  (NotDetected)   08/09/23  19:40    


 


Urine Cocaine Screen  Not Detected  (NotDetected)   08/09/23  19:40    


 


U Marijuana (THC) Screen  Detected  (NotDetected)  H  08/09/23  19:40    


 


Serum Alcohol  <10 mg/dL  08/09/23  19:40    














Assessment


Major depressive disorder


Intentional overdose


Nicotine dependence





Plan:


-At this time patient DOES meet criteria for inpatient psychiatric admission.  

The standard of care would have the patient be admitted involuntarily on the 

psychiatric unit.  However, after significant discussion with the patient's 

family and with the patient, we are foregoing inpatient psychiatric admission 

for a less restrictive option. The patient's family acknowledges that this is 

not the standard of care.  The patient has numerous protective factors weighing 

in on the decision including: supervision of her medications by her , no 

access to firearms other weapons, duty to her family, attempts prior to this 

overdose, Baptism beliefs against suicide, strong family support, future and 

goal orientation, and the pursuit of aftercare. 





-Delirium precautions recommended with patient including - avoiding use of 

narcotics and CNS sedatives, limit anticholinergic medications when possible, 

frequent re-orientation, minimize use of restraints, open window shades during 

the day and close them at night





-Would recommend the following medication changes/additions: 


No medication recommendations are made at this time





-The patient signed an AMA form for discharge.  The risks, benefits, and 

treatment options were discussed with the family and the patient in detail.  

They understand that this is not the standard of care.  However, protective 

factors, appropriate safety planning, and alternative treatment will be 

utilized. Patient is cleared for discharge by Psychiatry.

## 2023-08-11 NOTE — P.DS
Providers


Date of admission: 


08/10/23 13:57





Expected date of discharge: 08/11/23


Attending physician: 


Kathy Bernard MD





Consults: 





                                        





08/09/23 21:44


Consult Physician Routine 


   Consulting Provider: Abelardo Villanueva


   Consult Reason/Comments: psych


   Do you want consulting provider notified?: Yes











Primary care physician: 


Stated None





Hospital Course: 





Metabolic Encephalopathy secondary to Intentional Drug Overdose


Suicidal Attempt








58-year-old female with history of depression brought in by family due to 

suspected intentional overdose on her medications and a suicidal attempt.  Pt wa

s brought in for monitoring and mental status did recover to baseline.  Pt seen 

by psychiatry who recommended inpatient hospitalization for mental health, but 

patient and  adamantly refused and withdrew their petition.  Pt 

ultimately left against medical advice.





Gen: awake, alert


HEENT: normocephalic, atraumatic, good hearing acuity, moist mucous membranes


Resp: good air exchange, breathing comfortably with no accessory muscle use


CVS: good distal perfusion x 4,


GI: soft, NTTP, ND


: no SPT, no CVAT, quesada catheter not present


MSK: no pitting edema, no clubbing


Neuro: non-focal, moving all extremities


Psych: cooperative, euthymic mood





Patient Condition at Discharge: Fair





Plan - Discharge Summary


New Discharge Prescriptions: 


No Action


   Ferrous Sulfate [Feosol] 325 mg PO BID


   DULoxetine HCL [Cymbalta] 60 mg PO DAILY


   Atorvastatin [Lipitor] 20 mg PO DAILY


   Omeprazole 20 mg PO DAILY


   Albuterol Sulfate [Proair Hfa] 1 - 2 puff INHALATION RT-Q6H PRN


     PRN Reason: Shortness Of Breath


Discharge Medication List





Albuterol Sulfate [Proair Hfa] 1 - 2 puff INHALATION RT-Q6H PRN 08/09/23 

[History]


Atorvastatin [Lipitor] 20 mg PO DAILY 08/09/23 [History]


DULoxetine HCL [Cymbalta] 60 mg PO DAILY 08/09/23 [History]


Ferrous Sulfate [Feosol] 325 mg PO BID 08/09/23 [History]


Omeprazole 20 mg PO DAILY 08/09/23 [History]








Follow up Appointment(s)/Referral(s): 


None,Stated [Primary Care Provider] - 1-2 days


Patient Instructions/Handouts:  Adult Overdose (ED)


Discharge Disposition: LEFT AGAINST MEDICAL ADVICE